# Patient Record
Sex: MALE | Race: WHITE | NOT HISPANIC OR LATINO | Employment: OTHER | URBAN - METROPOLITAN AREA
[De-identification: names, ages, dates, MRNs, and addresses within clinical notes are randomized per-mention and may not be internally consistent; named-entity substitution may affect disease eponyms.]

---

## 2023-03-15 LAB — PROSTATE SPECIFIC AG (NG/ML) IN SER/PLAS: 2.35 NG/ML (ref 0–4)

## 2023-05-10 ENCOUNTER — OFFICE VISIT (OUTPATIENT)
Dept: PRIMARY CARE | Facility: CLINIC | Age: 68
End: 2023-05-10
Payer: MEDICARE

## 2023-05-10 VITALS
BODY MASS INDEX: 21.09 KG/M2 | TEMPERATURE: 97.6 F | WEIGHT: 147 LBS | HEART RATE: 91 BPM | SYSTOLIC BLOOD PRESSURE: 167 MMHG | DIASTOLIC BLOOD PRESSURE: 92 MMHG | OXYGEN SATURATION: 96 %

## 2023-05-10 DIAGNOSIS — C61 PROSTATE CANCER (MULTI): ICD-10-CM

## 2023-05-10 DIAGNOSIS — Z12.11 ENCOUNTER FOR SCREENING FOR MALIGNANT NEOPLASM OF COLON: Primary | ICD-10-CM

## 2023-05-10 PROBLEM — N52.35 ERECTILE DYSFUNCTION FOLLOWING RADIATION THERAPY: Status: ACTIVE | Noted: 2023-05-10

## 2023-05-10 PROBLEM — E78.5 HYPERLIPIDEMIA: Status: ACTIVE | Noted: 2023-05-10

## 2023-05-10 PROBLEM — D12.6 TUBULAR ADENOMA OF COLON: Status: ACTIVE | Noted: 2023-05-10

## 2023-05-10 PROBLEM — J31.0 CHRONIC RHINITIS: Status: ACTIVE | Noted: 2023-05-10

## 2023-05-10 PROBLEM — J34.2 NASAL SEPTAL DEVIATION: Status: ACTIVE | Noted: 2023-05-10

## 2023-05-10 PROBLEM — I49.3 PREMATURE VENTRICULAR CONTRACTIONS: Status: ACTIVE | Noted: 2023-05-10

## 2023-05-10 PROBLEM — N40.0 BENIGN LOCALIZED HYPERPLASIA OF PROSTATE: Status: ACTIVE | Noted: 2023-05-10

## 2023-05-10 PROBLEM — N40.2 PROSTATE NODULE: Status: ACTIVE | Noted: 2023-05-10

## 2023-05-10 PROBLEM — I10 WHITE COAT SYNDROME WITH HYPERTENSION: Status: ACTIVE | Noted: 2023-05-10

## 2023-05-10 PROBLEM — J32.4 CHRONIC PANSINUSITIS: Status: ACTIVE | Noted: 2023-05-10

## 2023-05-10 PROBLEM — J33.9 NASAL POLYP: Status: ACTIVE | Noted: 2023-05-10

## 2023-05-10 PROBLEM — J34.89 LESION OR MASS OF PARANASAL SINUSES: Status: ACTIVE | Noted: 2023-05-10

## 2023-05-10 PROBLEM — I10 BENIGN ESSENTIAL HYPERTENSION: Status: ACTIVE | Noted: 2023-05-10

## 2023-05-10 PROBLEM — E55.9 VITAMIN D DEFICIENCY: Status: ACTIVE | Noted: 2023-05-10

## 2023-05-10 PROBLEM — J34.89 NASAL DRAINAGE: Status: ACTIVE | Noted: 2023-05-10

## 2023-05-10 PROCEDURE — 1159F MED LIST DOCD IN RCRD: CPT | Performed by: STUDENT IN AN ORGANIZED HEALTH CARE EDUCATION/TRAINING PROGRAM

## 2023-05-10 PROCEDURE — 3077F SYST BP >= 140 MM HG: CPT | Performed by: STUDENT IN AN ORGANIZED HEALTH CARE EDUCATION/TRAINING PROGRAM

## 2023-05-10 PROCEDURE — 3080F DIAST BP >= 90 MM HG: CPT | Performed by: STUDENT IN AN ORGANIZED HEALTH CARE EDUCATION/TRAINING PROGRAM

## 2023-05-10 PROCEDURE — 1160F RVW MEDS BY RX/DR IN RCRD: CPT | Performed by: STUDENT IN AN ORGANIZED HEALTH CARE EDUCATION/TRAINING PROGRAM

## 2023-05-10 PROCEDURE — G0439 PPPS, SUBSEQ VISIT: HCPCS | Performed by: STUDENT IN AN ORGANIZED HEALTH CARE EDUCATION/TRAINING PROGRAM

## 2023-05-10 PROCEDURE — 99214 OFFICE O/P EST MOD 30 MIN: CPT | Performed by: STUDENT IN AN ORGANIZED HEALTH CARE EDUCATION/TRAINING PROGRAM

## 2023-05-10 PROCEDURE — 1036F TOBACCO NON-USER: CPT | Performed by: STUDENT IN AN ORGANIZED HEALTH CARE EDUCATION/TRAINING PROGRAM

## 2023-05-10 PROCEDURE — 1170F FXNL STATUS ASSESSED: CPT | Performed by: STUDENT IN AN ORGANIZED HEALTH CARE EDUCATION/TRAINING PROGRAM

## 2023-05-10 RX ORDER — MULTIVITAMIN
1 TABLET ORAL DAILY
COMMUNITY

## 2023-05-10 RX ORDER — ACETAMINOPHEN 500 MG
TABLET ORAL
COMMUNITY
End: 2023-10-24 | Stop reason: ALTCHOICE

## 2023-05-10 RX ORDER — PRAMOXINE HYDROCHLORIDE AND HYDROCORTISONE ACETATE 100; 100 MG/10G; MG/10G
AEROSOL, FOAM TOPICAL 2 TIMES DAILY PRN
COMMUNITY
Start: 2023-04-23

## 2023-05-10 ASSESSMENT — ACTIVITIES OF DAILY LIVING (ADL)
MANAGING_FINANCES: INDEPENDENT
DOING_HOUSEWORK: INDEPENDENT
GROCERY_SHOPPING: INDEPENDENT
DRESSING: INDEPENDENT
TAKING_MEDICATION: INDEPENDENT
BATHING: INDEPENDENT

## 2023-05-10 ASSESSMENT — PATIENT HEALTH QUESTIONNAIRE - PHQ9
SUM OF ALL RESPONSES TO PHQ9 QUESTIONS 1 AND 2: 0
2. FEELING DOWN, DEPRESSED OR HOPELESS: NOT AT ALL
1. LITTLE INTEREST OR PLEASURE IN DOING THINGS: NOT AT ALL

## 2023-05-10 NOTE — PROGRESS NOTES
Subjective   Reason for Visit: Man Flannery is an 67 y.o. male here for a Medicare Wellness visit.     Past Medical, Surgical, and Family History reviewed and updated in chart.    Reviewed all medications by prescribing practitioner or clinical pharmacist (such as prescriptions, OTCs, herbal therapies and supplements) and documented in the medical record.    HPI  prostate cancer s/p therapy in remission, ?HTN, ?HLD presenting for MWV and f/u.     Life/social: Retired ; [employer] rights defense. . One son, age 36. Hobbies include travel, exercise (walking). No tobacco. Social low risk EtOH, no illicits. Regular light exercise.      Re: prostate cancer - dx'd with prostate cancer in 2019, now with oligometastatic recurrence. Plan is for ADT and another round of radiation therapy. He has lots of questions for me about timing.       Re: CV - had PVCs a few years back. Heart monitor and TTE were essentially normal. Sx resolved. BP at home is at goal, all readings 130 SBP or less, DBP 90 or less. Reports no sx high or low from HTN; denies blurry vision, HA, dizziness LoC CP SoB Palmer and leg swelling      Re: HM - MWV completed today. Shots UTD. CRS due this year, he is inquiring about getting this done prior to his XRT. PSA as per urology team.      PMHx, FHx, Social Hx, Surg Hx personally reviewed at this appointment. No pertinent findings and/or changes from prior (if applicable).     ROS: Denies wt gain/loss f/c HA LoC CP SOB NVDC. See HPI above, and scanned sheet (if applicable). All other systems are reviewed and are without complaint.       Patient Care Team:  Seferino Sandy MD as PCP - General     Review of Systems    Objective   Vitals:  BP (!) 167/92   Pulse 91   Temp 36.4 °C (97.6 °F)   Wt 66.7 kg (147 lb)   SpO2 96%   BMI 21.09 kg/m²       Physical Exam  Gen: well developed in NAD. AAO x3.  HEENT: NC/AT. Anicteric sclera, symmetric pupils. MMM no thrush.  Neck: Soft, supple. No LAD. No  goiter.   CV: RRR nl s1s2 no m/r/g  Pulm: CTAB no w/r/r, good air exchange  GI: soft NTND BS+ no hsm  Ext: WWP no edema  Neuro: II-XII grossly intact, nonfocal systemic findings  MSK: 5/5 strength b/l UE and LE  Gait: unremarkable    Assessment/Plan   # Prostate Ca  - agree with XRT and ADT  - follow up urology/rad-onc    # Colon polyp: screening due this year  - will touch base with gastroenterologist about moving up his scope prior to XRT     # Health Maintenance  - routine blood work; meds are current  - Colon Cancer Screening: ordered, see above  - PSA: through urology  - Immunizations: UTD    Problem List Items Addressed This Visit       Prostate cancer (CMS/HCC)     Other Visit Diagnoses       Encounter for screening for malignant neoplasm of colon    -  Primary    Relevant Orders    Colonoscopy

## 2023-05-10 NOTE — PATIENT INSTRUCTIONS
Please stop at the lab ([address]) to complete your blood and/or urine work that your urology team ordered for you; I do not need additional labs that are different from what they ordered.    I have reached out to Dr. Milner about timing of a colonoscopy    I agree with XRT and ADT for your prostate cancer

## 2023-05-11 LAB
PROSTATE SPECIFIC AG (NG/ML) IN SER/PLAS: 2.87 NG/ML (ref 0–4)
TESTOSTERONE (NG/DL) IN SER/PLAS: 770 NG/DL (ref 240–1000)

## 2023-05-30 LAB
ALANINE AMINOTRANSFERASE (SGPT) (U/L) IN SER/PLAS: 13 U/L (ref 10–52)
ALBUMIN (G/DL) IN SER/PLAS: 4.1 G/DL (ref 3.4–5)
ALKALINE PHOSPHATASE (U/L) IN SER/PLAS: 39 U/L (ref 33–136)
ANION GAP IN SER/PLAS: 10 MMOL/L (ref 10–20)
ASPARTATE AMINOTRANSFERASE (SGOT) (U/L) IN SER/PLAS: 15 U/L (ref 9–39)
BASOPHILS (10*3/UL) IN BLOOD BY AUTOMATED COUNT: 0.04 X10E9/L (ref 0–0.1)
BASOPHILS/100 LEUKOCYTES IN BLOOD BY AUTOMATED COUNT: 0.8 % (ref 0–2)
BILIRUBIN TOTAL (MG/DL) IN SER/PLAS: 0.6 MG/DL (ref 0–1.2)
CALCIUM (MG/DL) IN SER/PLAS: 9.6 MG/DL (ref 8.6–10.6)
CARBON DIOXIDE, TOTAL (MMOL/L) IN SER/PLAS: 29 MMOL/L (ref 21–32)
CHLORIDE (MMOL/L) IN SER/PLAS: 106 MMOL/L (ref 98–107)
CREATININE (MG/DL) IN SER/PLAS: 0.91 MG/DL (ref 0.5–1.3)
EOSINOPHILS (10*3/UL) IN BLOOD BY AUTOMATED COUNT: 0.17 X10E9/L (ref 0–0.7)
EOSINOPHILS/100 LEUKOCYTES IN BLOOD BY AUTOMATED COUNT: 3.3 % (ref 0–6)
ERYTHROCYTE DISTRIBUTION WIDTH (RATIO) BY AUTOMATED COUNT: 12.2 % (ref 11.5–14.5)
ERYTHROCYTE MEAN CORPUSCULAR HEMOGLOBIN CONCENTRATION (G/DL) BY AUTOMATED: 33.4 G/DL (ref 32–36)
ERYTHROCYTE MEAN CORPUSCULAR VOLUME (FL) BY AUTOMATED COUNT: 89 FL (ref 80–100)
ERYTHROCYTES (10*6/UL) IN BLOOD BY AUTOMATED COUNT: 5.19 X10E12/L (ref 4.5–5.9)
GFR MALE: >90 ML/MIN/1.73M2
GLUCOSE (MG/DL) IN SER/PLAS: 90 MG/DL (ref 74–99)
HEMATOCRIT (%) IN BLOOD BY AUTOMATED COUNT: 46.4 % (ref 41–52)
HEMOGLOBIN (G/DL) IN BLOOD: 15.5 G/DL (ref 13.5–17.5)
IMMATURE GRANULOCYTES/100 LEUKOCYTES IN BLOOD BY AUTOMATED COUNT: 0.2 % (ref 0–0.9)
LEUKOCYTES (10*3/UL) IN BLOOD BY AUTOMATED COUNT: 5.1 X10E9/L (ref 4.4–11.3)
LYMPHOCYTES (10*3/UL) IN BLOOD BY AUTOMATED COUNT: 1.76 X10E9/L (ref 1.2–4.8)
LYMPHOCYTES/100 LEUKOCYTES IN BLOOD BY AUTOMATED COUNT: 34.3 % (ref 13–44)
MONOCYTES (10*3/UL) IN BLOOD BY AUTOMATED COUNT: 0.56 X10E9/L (ref 0.1–1)
MONOCYTES/100 LEUKOCYTES IN BLOOD BY AUTOMATED COUNT: 10.9 % (ref 2–10)
NEUTROPHILS (10*3/UL) IN BLOOD BY AUTOMATED COUNT: 2.59 X10E9/L (ref 1.2–7.7)
NEUTROPHILS/100 LEUKOCYTES IN BLOOD BY AUTOMATED COUNT: 50.5 % (ref 40–80)
PLATELETS (10*3/UL) IN BLOOD AUTOMATED COUNT: 227 X10E9/L (ref 150–450)
POTASSIUM (MMOL/L) IN SER/PLAS: 4.4 MMOL/L (ref 3.5–5.3)
PROSTATE SPECIFIC AG (NG/ML) IN SER/PLAS: 2.82 NG/ML (ref 0–4)
PROTEIN TOTAL: 6.5 G/DL (ref 6.4–8.2)
SODIUM (MMOL/L) IN SER/PLAS: 141 MMOL/L (ref 136–145)
UREA NITROGEN (MG/DL) IN SER/PLAS: 20 MG/DL (ref 6–23)

## 2023-09-26 LAB
APPEARANCE, URINE: NORMAL
BILIRUBIN, URINE: NEGATIVE
BLOOD, URINE: NEGATIVE
COLOR, URINE: YELLOW
GLUCOSE, URINE: NEGATIVE MG/DL
KETONES, URINE: NEGATIVE MG/DL
LEUKOCYTE ESTERASE, URINE: NEGATIVE
NITRITE, URINE: NEGATIVE
PH, URINE: 5 (ref 5–8)
PROTEIN, URINE: NEGATIVE MG/DL
SPECIFIC GRAVITY, URINE: 1.02 (ref 1–1.03)
UROBILINOGEN, URINE: <2 MG/DL (ref 0–1.9)

## 2023-09-27 LAB — URINE CULTURE: NO GROWTH

## 2023-10-22 PROBLEM — Z79.818 ANDROGEN DEPRIVATION THERAPY: Status: ACTIVE | Noted: 2023-10-22

## 2023-10-22 PROBLEM — Z79.2 NEED FOR PROPHYLACTIC ANTIBIOTIC: Status: ACTIVE | Noted: 2023-10-22

## 2023-10-22 PROBLEM — C44.92 SCC (SQUAMOUS CELL CARCINOMA): Status: ACTIVE | Noted: 2023-10-22

## 2023-10-22 PROBLEM — R03.0 ELEVATED BLOOD PRESSURE READING: Status: ACTIVE | Noted: 2023-10-22

## 2023-10-22 RX ORDER — SODIUM PICOSULFATE, MAGNESIUM OXIDE, AND ANHYDROUS CITRIC ACID 12; 3.5; 1 G/175ML; G/175ML; MG/175ML
LIQUID ORAL
COMMUNITY
Start: 2023-05-11 | End: 2023-10-24 | Stop reason: ALTCHOICE

## 2023-10-22 ASSESSMENT — PATIENT HEALTH QUESTIONNAIRE - PHQ9
8. MOVING OR SPEAKING SO SLOWLY THAT OTHER PEOPLE COULD HAVE NOTICED. OR THE OPPOSITE, BEING SO FIGETY OR RESTLESS THAT YOU HAVE BEEN MOVING AROUND A LOT MORE THAN USUAL: 0
SUM OF ALL RESPONSES TO PHQ QUESTIONS 1-9: 0
5. POOR APPETITE OR OVEREATING: NOT AT ALL
2. FEELING DOWN, DEPRESSED OR HOPELESS: NOT AT ALL
1. LITTLE INTEREST OR PLEASURE IN DOING THINGS: NOT AT ALL
9. THOUGHTS THAT YOU WOULD BE BETTER OFF DEAD, OR OF HURTING YOURSELF: NOT AT ALL
7. TROUBLE CONCENTRATING ON THINGS, SUCH AS READING THE NEWSPAPER OR WATCHING TELEVISION: 0
8. MOVING OR SPEAKING SO SLOWLY THAT OTHER PEOPLE COULD HAVE NOTICED. OR THE OPPOSITE, BEING SO FIGETY OR RESTLESS THAT YOU HAVE BEEN MOVING AROUND A LOT MORE THAN USUAL: NOT AT ALL
6. FEELING BAD ABOUT YOURSELF - OR THAT YOU ARE A FAILURE OR HAVE LET YOURSELF OR YOUR FAMILY DOWN: 0
3. TROUBLE FALLING OR STAYING ASLEEP OR SLEEPING TOO MUCH: NOT AT ALL
8. MOVING OR SPEAKING SO SLOWLY THAT OTHER PEOPLE COULD HAVE NOTICED. OR THE OPPOSITE, BEING SO FIGETY OR RESTLESS THAT YOU HAVE BEEN MOVING AROUND A LOT MORE THAN USUAL: 0
2. FEELING DOWN, DEPRESSED, IRRITABLE, OR HOPELESS: NOT AT ALL
5. POOR APPETITE OR OVEREATING: 0
5. POOR APPETITE OR OVEREATING: 0
6. FEELING BAD ABOUT YOURSELF - OR THAT YOU ARE A FAILURE OR HAVE LET YOURSELF OR YOUR FAMILY DOWN: NOT AT ALL
6. FEELING BAD ABOUT YOURSELF - OR THAT YOU ARE A FAILURE OR HAVE LET YOURSELF OR YOUR FAMILY DOWN: 0
9. THOUGHTS THAT YOU WOULD BE BETTER OFF DEAD, OR OF HURTING YOURSELF: NOT AT ALL
4. FEELING TIRED OR HAVING LITTLE ENERGY: 0
4. FEELING TIRED OR HAVING LITTLE ENERGY: NOT AT ALL
5. POOR APPETITE OR OVEREATING: NOT AT ALL
8. MOVING OR SPEAKING SO SLOWLY THAT OTHER PEOPLE COULD HAVE NOTICED. OR THE OPPOSITE, BEING SO FIGETY OR RESTLESS THAT YOU HAVE BEEN MOVING AROUND A LOT MORE THAN USUAL: NOT AT ALL
5. POOR APPETITE OR OVEREATING: NOT AT ALL
3. TROUBLE FALLING OR STAYING ASLEEP OR SLEEPING TOO MUCH: NOT AT ALL
SUM OF ALL RESPONSES TO PHQ QUESTIONS 1-9: 0
2. FEELING DOWN, DEPRESSED, IRRITABLE, OR HOPELESS: 0
5. POOR APPETITE OR OVEREATING: NOT AT ALL
9. THOUGHTS THAT YOU WOULD BE BETTER OFF DEAD, OR OF HURTING YOURSELF: 0
7. TROUBLE CONCENTRATING ON THINGS, SUCH AS READING THE NEWSPAPER OR WATCHING TELEVISION: NOT AT ALL
3. TROUBLE FALLING OR STAYING ASLEEP OR SLEEPING TOO MUCH: NOT AT ALL
4. FEELING TIRED OR HAVING LITTLE ENERGY: 0
1. LITTLE INTEREST OR PLEASURE IN DOING THINGS: NOT AT ALL
7. TROUBLE CONCENTRATING ON THINGS, SUCH AS READING THE NEWSPAPER OR WATCHING TELEVISION: NOT AT ALL
6. FEELING BAD ABOUT YOURSELF - OR THAT YOU ARE A FAILURE OR HAVE LET YOURSELF OR YOUR FAMILY DOWN: NOT AT ALL
6. FEELING BAD ABOUT YOURSELF - OR THAT YOU ARE A FAILURE OR HAVE LET YOURSELF OR YOUR FAMILY DOWN: NOT AT ALL
9. THOUGHTS THAT YOU WOULD BE BETTER OFF DEAD, OR OF HURTING YOURSELF: NOT AT ALL
4. FEELING TIRED OR HAVING LITTLE ENERGY: NOT AT ALL
7. TROUBLE CONCENTRATING ON THINGS, SUCH AS READING THE NEWSPAPER OR WATCHING TELEVISION: NOT AT ALL
9. THOUGHTS THAT YOU WOULD BE BETTER OFF DEAD, OR OF HURTING YOURSELF: NOT AT ALL
SUM OF ALL RESPONSES TO PHQ QUESTIONS 1-9: 0
4. FEELING TIRED OR HAVING LITTLE ENERGY: NOT AT ALL
SUM OF ALL RESPONSES TO PHQ QUESTIONS 1-9: 0
3. TROUBLE FALLING OR STAYING ASLEEP OR SLEEPING TOO MUCH: 0
1. LITTLE INTEREST OR PLEASURE IN DOING THINGS: NOT AT ALL
1. LITTLE INTEREST OR PLEASURE IN DOING THINGS: NOT AT ALL
7. TROUBLE CONCENTRATING ON THINGS, SUCH AS READING THE NEWSPAPER OR WATCHING TELEVISION: NOT AT ALL
8. MOVING OR SPEAKING SO SLOWLY THAT OTHER PEOPLE COULD HAVE NOTICED. OR THE OPPOSITE, BEING SO FIGETY OR RESTLESS THAT YOU HAVE BEEN MOVING AROUND A LOT MORE THAN USUAL: NOT AT ALL
8. MOVING OR SPEAKING SO SLOWLY THAT OTHER PEOPLE COULD HAVE NOTICED. OR THE OPPOSITE, BEING SO FIGETY OR RESTLESS THAT YOU HAVE BEEN MOVING AROUND A LOT MORE THAN USUAL: NOT AT ALL
1. LITTLE INTEREST OR PLEASURE IN DOING THINGS: 0
7. TROUBLE CONCENTRATING ON THINGS, SUCH AS READING THE NEWSPAPER OR WATCHING TELEVISION: 0
3. TROUBLE FALLING OR STAYING ASLEEP OR SLEEPING TOO MUCH: NOT AT ALL
6. FEELING BAD ABOUT YOURSELF - OR THAT YOU ARE A FAILURE OR HAVE LET YOURSELF OR YOUR FAMILY DOWN: NOT AT ALL
2. FEELING DOWN, DEPRESSED, IRRITABLE, OR HOPELESS: NOT AT ALL
1. LITTLE INTEREST OR PLEASURE IN DOING THINGS: 0
2. FEELING DOWN, DEPRESSED OR HOPELESS: NOT AT ALL
3. TROUBLE FALLING OR STAYING ASLEEP OR SLEEPING TOO MUCH: 0
9. THOUGHTS THAT YOU WOULD BE BETTER OFF DEAD, OR OF HURTING YOURSELF: 0
2. FEELING DOWN, DEPRESSED, IRRITABLE, OR HOPELESS: 0
4. FEELING TIRED OR HAVING LITTLE ENERGY: NOT AT ALL

## 2023-10-23 ENCOUNTER — PHARMACY VISIT (OUTPATIENT)
Dept: PHARMACY | Facility: CLINIC | Age: 68
End: 2023-10-23
Payer: COMMERCIAL

## 2023-10-23 ENCOUNTER — SPECIALTY PHARMACY (OUTPATIENT)
Dept: PHARMACY | Facility: CLINIC | Age: 68
End: 2023-10-23

## 2023-10-23 DIAGNOSIS — C61 PROSTATE CANCER (MULTI): Primary | ICD-10-CM

## 2023-10-23 PROCEDURE — RXMED WILLOW AMBULATORY MEDICATION CHARGE

## 2023-10-23 RX ORDER — ABIRATERONE ACETATE 250 MG/1
1000 TABLET ORAL DAILY
Qty: 240 TABLET | Refills: 3 | Status: CANCELLED | OUTPATIENT
Start: 2023-10-23

## 2023-10-23 RX ORDER — PREDNISONE 5 MG/1
5 TABLET ORAL DAILY
Qty: 60 TABLET | Refills: 11 | Status: SHIPPED | OUTPATIENT
Start: 2023-10-23 | End: 2024-04-18 | Stop reason: ALTCHOICE

## 2023-10-23 RX ORDER — ABIRATERONE ACETATE 250 MG/1
1000 TABLET ORAL DAILY
Qty: 240 TABLET | Refills: 11 | Status: SHIPPED | OUTPATIENT
Start: 2023-10-23 | End: 2024-04-18 | Stop reason: ALTCHOICE

## 2023-10-24 ENCOUNTER — APPOINTMENT (OUTPATIENT)
Dept: LAB | Facility: CLINIC | Age: 68
End: 2023-10-24
Payer: MEDICARE

## 2023-10-24 ENCOUNTER — SPECIALTY PHARMACY (OUTPATIENT)
Dept: HEMATOLOGY/ONCOLOGY | Facility: CLINIC | Age: 68
End: 2023-10-24
Payer: MEDICARE

## 2023-10-24 ENCOUNTER — LAB (OUTPATIENT)
Dept: LAB | Facility: CLINIC | Age: 68
End: 2023-10-24
Payer: MEDICARE

## 2023-10-24 DIAGNOSIS — C61 PROSTATE CANCER (MULTI): Primary | ICD-10-CM

## 2023-10-24 DIAGNOSIS — C61 MALIGNANT NEOPLASM OF PROSTATE (MULTI): ICD-10-CM

## 2023-10-24 LAB
ALBUMIN SERPL BCP-MCNC: 4.1 G/DL (ref 3.4–5)
ALP SERPL-CCNC: 44 U/L (ref 33–136)
ALT SERPL W P-5'-P-CCNC: 32 U/L (ref 10–52)
ANION GAP SERPL CALC-SCNC: 12 MMOL/L (ref 10–20)
AST SERPL W P-5'-P-CCNC: 27 U/L (ref 9–39)
BASOPHILS # BLD AUTO: 0.04 X10*3/UL (ref 0–0.1)
BASOPHILS NFR BLD AUTO: 0.9 %
BILIRUB SERPL-MCNC: 0.8 MG/DL (ref 0–1.2)
BUN SERPL-MCNC: 18 MG/DL (ref 6–23)
CALCIUM SERPL-MCNC: 9.9 MG/DL (ref 8.6–10.6)
CHLORIDE SERPL-SCNC: 105 MMOL/L (ref 98–107)
CO2 SERPL-SCNC: 30 MMOL/L (ref 21–32)
CREAT SERPL-MCNC: 1.05 MG/DL (ref 0.5–1.3)
EOSINOPHIL # BLD AUTO: 0.29 X10*3/UL (ref 0–0.7)
EOSINOPHIL NFR BLD AUTO: 6.3 %
ERYTHROCYTE [DISTWIDTH] IN BLOOD BY AUTOMATED COUNT: 12.4 % (ref 11.5–14.5)
GFR SERPL CREATININE-BSD FRML MDRD: 77 ML/MIN/1.73M*2
GLUCOSE SERPL-MCNC: 87 MG/DL (ref 74–99)
HCT VFR BLD AUTO: 44.2 % (ref 41–52)
HGB BLD-MCNC: 14.4 G/DL (ref 13.5–17.5)
IMM GRANULOCYTES # BLD AUTO: 0 X10*3/UL (ref 0–0.7)
IMM GRANULOCYTES NFR BLD AUTO: 0 % (ref 0–0.9)
LYMPHOCYTES # BLD AUTO: 1.02 X10*3/UL (ref 1.2–4.8)
LYMPHOCYTES NFR BLD AUTO: 22.2 %
MCH RBC QN AUTO: 29.9 PG (ref 26–34)
MCHC RBC AUTO-ENTMCNC: 32.6 G/DL (ref 32–36)
MCV RBC AUTO: 92 FL (ref 80–100)
MONOCYTES # BLD AUTO: 0.59 X10*3/UL (ref 0.1–1)
MONOCYTES NFR BLD AUTO: 12.9 %
NEUTROPHILS # BLD AUTO: 2.65 X10*3/UL (ref 1.2–7.7)
NEUTROPHILS NFR BLD AUTO: 57.7 %
NRBC BLD-RTO: ABNORMAL /100{WBCS}
PLATELET # BLD AUTO: 245 X10*3/UL (ref 150–450)
PMV BLD AUTO: 9 FL (ref 7.5–11.5)
POTASSIUM SERPL-SCNC: 4 MMOL/L (ref 3.5–5.3)
PROT SERPL-MCNC: 6.5 G/DL (ref 6.4–8.2)
PSA SERPL-MCNC: <0.1 NG/ML
RBC # BLD AUTO: 4.81 X10*6/UL (ref 4.5–5.9)
SODIUM SERPL-SCNC: 143 MMOL/L (ref 136–145)
TESTOST SERPL-MCNC: <30 NG/DL (ref 240–1000)
WBC # BLD AUTO: 4.6 X10*3/UL (ref 4.4–11.3)

## 2023-10-24 PROCEDURE — 85025 COMPLETE CBC W/AUTO DIFF WBC: CPT

## 2023-10-24 PROCEDURE — 36415 COLL VENOUS BLD VENIPUNCTURE: CPT

## 2023-10-24 PROCEDURE — 84153 ASSAY OF PSA TOTAL: CPT | Performed by: NURSE PRACTITIONER

## 2023-10-24 PROCEDURE — 84403 ASSAY OF TOTAL TESTOSTERONE: CPT | Performed by: NURSE PRACTITIONER

## 2023-10-24 PROCEDURE — 80053 COMPREHEN METABOLIC PANEL: CPT | Performed by: NURSE PRACTITIONER

## 2023-10-24 RX ORDER — ALBUTEROL SULFATE 0.83 MG/ML
3 SOLUTION RESPIRATORY (INHALATION) AS NEEDED
Status: CANCELLED | OUTPATIENT
Start: 2023-10-25

## 2023-10-24 RX ORDER — DIPHENHYDRAMINE HYDROCHLORIDE 50 MG/ML
50 INJECTION INTRAMUSCULAR; INTRAVENOUS AS NEEDED
Status: CANCELLED | OUTPATIENT
Start: 2023-10-25

## 2023-10-24 RX ORDER — FAMOTIDINE 10 MG/ML
20 INJECTION INTRAVENOUS ONCE AS NEEDED
Status: CANCELLED | OUTPATIENT
Start: 2023-10-25

## 2023-10-24 RX ORDER — EPINEPHRINE 0.3 MG/.3ML
0.3 INJECTION SUBCUTANEOUS EVERY 5 MIN PRN
Status: CANCELLED | OUTPATIENT
Start: 2023-10-25

## 2023-10-24 NOTE — PROGRESS NOTES
"Patient ID: Man Flannery is a 67 y.o. male.  Attending Physician: Dr. Dom Jules  Cancer Diagnosis:  Cancer Staging   No matching staging information was found for the patient.   Current Therapy: ADT (Trelstart IM q12mos)  Abiraterone Acetate 1000 mg PO daily  Prednisone 5 mg PO daily    Subjective  1    2/5/19: Dx intermediate prostate ca ([place] 3+4=7/iPSA 9.98/ GG2)    8/28/20: completed EBRT + 6 months ADT    9/25/20: PSA kimmy 0.42    4/5/21: PSA 1.24    3/10/22: PSA 1.44    9/14/22: PSA 1.65    3/15/23: PSA 2.35    4/25/23: PSMA PET with avid pre-sacral LNs    5/18/23: Start ADT (Trelstar)    6/1/23: Start abiraterone + prednisone    8/4/23: Elevated LFT's- grade 1- continue abiraterone/prednisone, ADT    8/16/23: Started radiation    Cancer History:  Oncology History   Prostate cancer (CMS/HCC)   5/10/2023 Initial Diagnosis    Prostate cancer (CMS/HCC)     6/1/2023 -  Chemotherapy    Abiraterone / PredniSONE, 84 Day Cycles         Interval History:  Man is doing well. He reports lately he has had noo real hot flashes. During radiation, he had 1-2 weeks of diarrhea after radiation which as resolved. Weight is stable. Urinary symptoms: some nocturia and some urgency during the day, but at baseline. No other new or concerning symptoms. The remainder of his ROS is otherwise negative.    HPI    Objective    BSA: 1.81 meters squared  /88   Pulse 85   Temp 36.5 °C (97.7 °F)   Resp 17   Ht (S) 1.753 m (5' 9.02\")   Wt 67.2 kg (148 lb 2.4 oz)   SpO2 97%   BMI 21.87 kg/m²     Physical Exam  PHYSICAL EXAM:   General: alert, well-dressed in NAD. Speech is fluent and coherent, words clear. Good insight. Oriented x4  Skin: warm, dry, and pink without cyanosis or nail clubbing. No rash, petechiae, or ecchymoses  HEENT: Normocephalic atraumatic. Sclera white, conjunctiva pink. EOMs intact. Hearing intact to spoken voice. No visible lesions  Respiratory: Chest expansion symmetric. No audible wheeze. Unlabored " breathing.  CV: Good color No edema bilaterally.  Psych: engaged, polite, appropriate conversation and eye contact.      Current Medications:    Current Outpatient Medications:     abiraterone (Zytiga) 250 mg tablet, Take 4 tablets (1,000 mg total) by mouth once daily.  Swallow whole. Do not eat 2 hrs before or 1 hr after., Disp: 240 tablet, Rfl: 11    docusate sodium (Colace) 50 mg capsule, Take 1 capsule (50 mg) by mouth 2 times a day as needed for constipation., Disp: , Rfl:     Epifoam 1-1 % foam, APPLY TOPICALLY TO AFFECTED AREA TWICE DAILY, Disp: , Rfl:     multivitamin (Daily Multi-Vitamin) tablet, Take 1 tablet by mouth once daily., Disp: , Rfl:     predniSONE (Deltasone) 5 mg tablet, Take 1 tablet (5 mg) by mouth once daily. Take with food., Disp: 60 tablet, Rfl: 11     Most Recent Labs:  Results for orders placed or performed in visit on 10/24/23   Comprehensive Metabolic Panel   Result Value Ref Range    Glucose 87 74 - 99 mg/dL    Sodium 143 136 - 145 mmol/L    Potassium 4.0 3.5 - 5.3 mmol/L    Chloride 105 98 - 107 mmol/L    Bicarbonate 30 21 - 32 mmol/L    Anion Gap 12 10 - 20 mmol/L    Urea Nitrogen 18 6 - 23 mg/dL    Creatinine 1.05 0.50 - 1.30 mg/dL    eGFR 77 >60 mL/min/1.73m*2    Calcium 9.9 8.6 - 10.6 mg/dL    Albumin 4.1 3.4 - 5.0 g/dL    Alkaline Phosphatase 44 33 - 136 U/L    Total Protein 6.5 6.4 - 8.2 g/dL    AST 27 9 - 39 U/L    Bilirubin, Total 0.8 0.0 - 1.2 mg/dL    ALT 32 10 - 52 U/L   CBC and Auto Differential   Result Value Ref Range    WBC 4.6 4.4 - 11.3 x10*3/uL    nRBC      RBC 4.81 4.50 - 5.90 x10*6/uL    Hemoglobin 14.4 13.5 - 17.5 g/dL    Hematocrit 44.2 41.0 - 52.0 %    MCV 92 80 - 100 fL    MCH 29.9 26.0 - 34.0 pg    MCHC 32.6 32.0 - 36.0 g/dL    RDW 12.4 11.5 - 14.5 %    Platelets 245 150 - 450 x10*3/uL    MPV 9.0 7.5 - 11.5 fL    Neutrophils % 57.7 40.0 - 80.0 %    Immature Granulocytes %, Automated 0.0 0.0 - 0.9 %    Lymphocytes % 22.2 13.0 - 44.0 %    Monocytes % 12.9 2.0 -  10.0 %    Eosinophils % 6.3 0.0 - 6.0 %    Basophils % 0.9 0.0 - 2.0 %    Neutrophils Absolute 2.65 1.20 - 7.70 x10*3/uL    Immature Granulocytes Absolute, Automated 0.00 0.00 - 0.70 x10*3/uL    Lymphocytes Absolute 1.02 (L) 1.20 - 4.80 x10*3/uL    Monocytes Absolute 0.59 0.10 - 1.00 x10*3/uL    Eosinophils Absolute 0.29 0.00 - 0.70 x10*3/uL    Basophils Absolute 0.04 0.00 - 0.10 x10*3/uL   Prostate Specific Antigen   Result Value Ref Range    Prostate Specific AG <0.10 <=4.00 ng/mL   Testosterone   Result Value Ref Range    Testosterone <30 (L) 240 - 1,000 ng/dL      Lab Results   Component Value Date    PSA <0.10 10/24/2023    PSA <0.10 08/03/2023    PSA 0.61 06/27/2023        Performance Status:  Asymptomatic  ECOG Score: 0- Fully active, able to carry on all pre-disease performance w/o restriction.  Karnofsky Score: 100 - Fully active, able to carry on all pre-disease performed without restriction      Assessment/Plan   Man Flannery is a 67 y.o. male with a history of intermediate risk (PSA 9.98 / [place] 3+4=7 / GG2) localized prostate cancer s/p EBRT + ADT (completed 8/28/20) who presents with rising PSA and avid pre-sacral LNs on PSMA PET consistent with recurrent prostate cancer.     He started ADT and AA/P with good PSA response. However, LFTs were noted to be elevated. They have since decreased to grade 1 and remained steady. Thus, we will continue as is, and monitor liver enzymes going forward.    # Recurrent prostate cancer  - Follows with radiation oncology (Dr. Ordoñez)  - Continue ADT every 3 months- due today, due next January  - Continue abiraterone + prednisone  - Plan for 1 year total course of treatment    #Elevated LFTs, resolved  - Continue to monitor    #Bone Health  - Ca/Vit D, continue with multivitamins/milk  - encourage regular physical activity    RTC at time of next injection with labs in January    Total time spent on this encounter was 40 minutes, which included preparation, direct time  with patient, documentation, and care coordination on the day of visit.    Doris Michelle, MSN, APRN, AGNP-C, AOCNP  Associate Nurse Practitioner  [facility]

## 2023-10-24 NOTE — PROGRESS NOTES
[facility] SPECIALTY PHARMACY PATIENT REASSESSMENT NOTE    [facility] SUPPLIED MEDICATION:  Abiraterone    The patient's care will be continued with the referring prescriber.     TOLERANCE:   Have you experienced any side effects from this medication? None    Are there any changes to current therapy regimen? None    EFFICACY:    Have you developed any new symptoms of your condition? None    How do you feel your medication is affecting your disease state? Stable    GOALS:  Your goals of therapy are: Prevent progression of cancer    COMPLIANCE / ADHERENCE:  Have you had any unplanned missed doses?No  If yes, how often do you miss doses and is there a particular contributing reason? N/A    What barriers to adherence does the patient have? (Answer Y/N for all):  Patient has a difficult time remembering to take medications? N  Difficult administration technique? N  Medication cost? N  Patient does not think medication is beneficial? N  Other? N/A  What actions were taken to address barriers? N/A    Does the patient have any barriers to self-administration (including physical and mental)? nO  List barriers: N/A  Describe actions taken to mitigate barriers: N/A    GENERAL ASSESSMENT:  Are there any changes to your home medications, OTCs or supplements? Yes - Medrec completed.    Do you have any new allergies? No     Have you been diagnosed with any new medical conditions? No    PATIENT MANAGEMENT:    Do you have any questions regarding your medications, or care? No    Do you have any concerns with access to your medications? No    How would you classify your Quality of Life on a scale of 1-10? 7    How would you describe your satisfaction with  Specialty Pharmacy services on a scale of 1-10? 10    Do you have any additional suggestions to improve  Specialty Pharmacy services: None    IMPRESSION/PLAN:  Is patient high risk (potential patients:  pregnancy, geriatric, pediatric)? Geriatric  Is laboratory follow-up  needed? No  Is a clinical intervention needed? No  Next reassessment date? 01/22/24  Additional comments: N/A    Lionel Valentine, PharmD, AAHIVP  Clinical Pharm Specialist -  Oncology  [facility]  Phone #: [phone]  Fax #: [phone]  Email: [email or web address]

## 2023-10-25 ENCOUNTER — OFFICE VISIT (OUTPATIENT)
Dept: HEMATOLOGY/ONCOLOGY | Facility: HOSPITAL | Age: 68
End: 2023-10-25
Payer: MEDICARE

## 2023-10-25 ENCOUNTER — INFUSION (OUTPATIENT)
Dept: HEMATOLOGY/ONCOLOGY | Facility: HOSPITAL | Age: 68
End: 2023-10-25
Payer: MEDICARE

## 2023-10-25 VITALS
HEART RATE: 85 BPM | HEIGHT: 69 IN | RESPIRATION RATE: 17 BRPM | OXYGEN SATURATION: 97 % | WEIGHT: 148.15 LBS | SYSTOLIC BLOOD PRESSURE: 154 MMHG | BODY MASS INDEX: 21.94 KG/M2 | DIASTOLIC BLOOD PRESSURE: 88 MMHG | TEMPERATURE: 97.7 F

## 2023-10-25 DIAGNOSIS — C61 PROSTATE CANCER (MULTI): Primary | ICD-10-CM

## 2023-10-25 DIAGNOSIS — C61 MALIGNANT NEOPLASM OF PROSTATE (MULTI): ICD-10-CM

## 2023-10-25 DIAGNOSIS — C61 PROSTATE CANCER (MULTI): ICD-10-CM

## 2023-10-25 PROCEDURE — 1126F AMNT PAIN NOTED NONE PRSNT: CPT | Performed by: NURSE PRACTITIONER

## 2023-10-25 PROCEDURE — 96402 CHEMO HORMON ANTINEOPL SQ/IM: CPT

## 2023-10-25 PROCEDURE — 99215 OFFICE O/P EST HI 40 MIN: CPT | Performed by: NURSE PRACTITIONER

## 2023-10-25 PROCEDURE — 3077F SYST BP >= 140 MM HG: CPT | Performed by: NURSE PRACTITIONER

## 2023-10-25 PROCEDURE — 1160F RVW MEDS BY RX/DR IN RCRD: CPT | Performed by: NURSE PRACTITIONER

## 2023-10-25 PROCEDURE — 3079F DIAST BP 80-89 MM HG: CPT | Performed by: NURSE PRACTITIONER

## 2023-10-25 PROCEDURE — 1036F TOBACCO NON-USER: CPT | Performed by: NURSE PRACTITIONER

## 2023-10-25 PROCEDURE — 1159F MED LIST DOCD IN RCRD: CPT | Performed by: NURSE PRACTITIONER

## 2023-10-25 PROCEDURE — 2500000004 HC RX 250 GENERAL PHARMACY W/ HCPCS (ALT 636 FOR OP/ED): Mod: JZ | Performed by: STUDENT IN AN ORGANIZED HEALTH CARE EDUCATION/TRAINING PROGRAM

## 2023-10-25 RX ORDER — ALBUTEROL SULFATE 0.83 MG/ML
3 SOLUTION RESPIRATORY (INHALATION) AS NEEDED
Status: CANCELLED | OUTPATIENT
Start: 2024-01-14

## 2023-10-25 RX ORDER — DIPHENHYDRAMINE HYDROCHLORIDE 50 MG/ML
50 INJECTION INTRAMUSCULAR; INTRAVENOUS AS NEEDED
Status: DISCONTINUED | OUTPATIENT
Start: 2023-10-25 | End: 2023-10-25 | Stop reason: HOSPADM

## 2023-10-25 RX ORDER — FAMOTIDINE 10 MG/ML
20 INJECTION INTRAVENOUS ONCE AS NEEDED
Status: DISCONTINUED | OUTPATIENT
Start: 2023-10-25 | End: 2023-10-25 | Stop reason: HOSPADM

## 2023-10-25 RX ORDER — ALBUTEROL SULFATE 0.83 MG/ML
3 SOLUTION RESPIRATORY (INHALATION) AS NEEDED
Status: DISCONTINUED | OUTPATIENT
Start: 2023-10-25 | End: 2023-10-25 | Stop reason: HOSPADM

## 2023-10-25 RX ORDER — EPINEPHRINE 0.3 MG/.3ML
0.3 INJECTION SUBCUTANEOUS EVERY 5 MIN PRN
Status: CANCELLED | OUTPATIENT
Start: 2024-01-14

## 2023-10-25 RX ORDER — EPINEPHRINE 0.3 MG/.3ML
0.3 INJECTION SUBCUTANEOUS EVERY 5 MIN PRN
Status: DISCONTINUED | OUTPATIENT
Start: 2023-10-25 | End: 2023-10-25 | Stop reason: HOSPADM

## 2023-10-25 RX ORDER — FAMOTIDINE 10 MG/ML
20 INJECTION INTRAVENOUS ONCE AS NEEDED
Status: CANCELLED | OUTPATIENT
Start: 2024-01-14

## 2023-10-25 RX ORDER — DIPHENHYDRAMINE HYDROCHLORIDE 50 MG/ML
50 INJECTION INTRAMUSCULAR; INTRAVENOUS AS NEEDED
Status: CANCELLED | OUTPATIENT
Start: 2024-01-14

## 2023-10-25 RX ADMIN — TRIPTORELIN PAMOATE 11.25 MG: KIT at 10:09

## 2023-10-25 ASSESSMENT — PAIN SCALES - GENERAL: PAINLEVEL: 0-NO PAIN

## 2023-10-25 NOTE — PROGRESS NOTES
0951 Patient arrived to infusion for Trelstar injection. Patient arrived from his clinic appointment- verbalized no issues.    1010 Patient tolerated injection without incident. Patient discharged ambulatory with support person.

## 2023-12-01 ENCOUNTER — ANCILLARY PROCEDURE (OUTPATIENT)
Dept: RADIOLOGY | Facility: CLINIC | Age: 68
End: 2023-12-01
Payer: MEDICARE

## 2023-12-01 DIAGNOSIS — C61 MALIGNANT NEOPLASM OF PROSTATE (MULTI): Primary | ICD-10-CM

## 2023-12-01 PROCEDURE — 2550000001 HC RX 255 CONTRASTS: Performed by: STUDENT IN AN ORGANIZED HEALTH CARE EDUCATION/TRAINING PROGRAM

## 2023-12-01 PROCEDURE — 74177 CT ABD & PELVIS W/CONTRAST: CPT | Performed by: RADIOLOGY

## 2023-12-01 PROCEDURE — 74177 CT ABD & PELVIS W/CONTRAST: CPT

## 2023-12-01 RX ADMIN — IOHEXOL 75 ML: 350 INJECTION, SOLUTION INTRAVENOUS at 10:54

## 2023-12-06 ENCOUNTER — TELEPHONE (OUTPATIENT)
Dept: RADIATION ONCOLOGY | Facility: HOSPITAL | Age: 68
End: 2023-12-06
Payer: MEDICARE

## 2023-12-06 NOTE — TELEPHONE ENCOUNTER
Called pt. to remind of appointment on 12/7/2023 at 10:00 am with GURPREET Daugherty.  Pt answered and confirmed appointment.

## 2023-12-06 NOTE — PROGRESS NOTES
Cancer synopsis:  Rad/onc: Dr. Ordoñez/ Dat CNP  Med/onc: Dr. Jules/ Viviana CNP    67 yo M with a history of favorable intermediate prostate cancer (cT1c, iPSA 9.98, GG2 on 2/12 cores+,  intraductal carcinoma present) who completed 70Gy/28fx to the prostate+SV (completed 8/28/20) + short-course ADT, with a PSA kimmy of 0.42 on 9/25/2020. The patient had evidence of rising since 4/5/2021 (with PSA 1.24), and had evidence of biochemical  recurrence in 3/15/2023 with a PSA of 2.35. PSMA PET was completed on 4/25/2023 which showed evidence of pre-sacral LN with PSMA avidity, without any evidence of local recurrence in the previously treated areas.     5/18/23: Start ADT (Trelstar)     6/1/23: Start abiraterone + prednisone    08/11/2023: Rt to Pelvic LN      History of presenting illness:    Patient ID: [id number]     Man Flannery is a 68 y.o. male who presents for his oligometastasic hormone sensitive prostate cancer now s/p RT and on current lng-term hormonal therapy.    RT Site: Prostate and SV 2020 and Pelivs LN 2023  RT Date: 07/22/2020 and 08/11/2023  Hormone therapy: Yes, currently on Adt and AA/p  Hot Flushes: Denies  Fatigue: Denies  Bone pain: Denies  SEJAL: 1  ED: Admits to loss of sexual function since   ED medications: No  IPSS: 6  Urinary symptoms: Admits to nocturia once a night. Does report some mild urine dribble however manageable. Does report seems to be related to caffeine intake. Denies hematuria or dysuria.  Urinary Medications: No  Rectal bleeding: Denies  Other systems: Denies SOB, CP or fever.      Review of systems:  Review of Systems   Constitutional:  Negative for fatigue, fever and unexpected weight change.   Respiratory:  Negative for cough, chest tightness and shortness of breath.    Cardiovascular:  Negative for chest pain, palpitations and leg swelling.   Gastrointestinal:  Negative for abdominal pain, anal bleeding, blood in stool, constipation, diarrhea and rectal pain.   Endocrine:  "Negative for cold intolerance, heat intolerance and polyuria.   Genitourinary:  Negative for decreased urine volume, difficulty urinating, dysuria, frequency, hematuria and urgency.   Musculoskeletal:  Negative for arthralgias, back pain, gait problem and joint swelling.   Skin: Negative.    Allergic/Immunologic: Negative.    Neurological:  Negative for dizziness, syncope and weakness.   Psychiatric/Behavioral: Negative.         Past Medical history  Past Medical History:   Diagnosis Date    Dysphagia, unspecified 10/03/2014    Pill dysphagia        Surgical/family history  Family History   Problem Relation Name Age of Onset    Hypertension Mother        Past Surgical History:   Procedure Laterality Date    OTHER SURGICAL HISTORY  03/15/2014    Injection For Chemonucleolysis Of Disk, With Discography    TONSILLECTOMY  09/24/2013    Tonsillectomy        Social History  Tobacco Use: Low Risk  (10/25/2023)    Patient History     Smoking Tobacco Use: Never     Smokeless Tobacco Use: Never     Passive Exposure: Not on file         Current med list:  Current Outpatient Medications   Medication Instructions    abiraterone (ZYTIGA) 1,000 mg, oral, Daily, Swallow whole. Do not eat 2 hrs before or 1 hr after.    docusate sodium (Colace) 50 mg capsule 1 capsule, oral, 2 times daily PRN    Epifoam 1-1 % foam APPLY TOPICALLY TO AFFECTED AREA TWICE DAILY    multivitamin (Daily Multi-Vitamin) tablet 1 tablet, oral, Daily    predniSONE (DELTASONE) 5 mg, oral, Daily, Take with food.        Last recorded vital:  /80 (BP Location: Left arm, Patient Position: Sitting, BP Cuff Size: Adult)   Pulse 92   Temp 35.9 °C (96.6 °F) (Temporal)   Resp 19   Ht 1.753 m (5' 9\")   Wt 67 kg (147 lb 11.3 oz)   SpO2 96%   BMI 21.81 kg/m²     Physical exam  Physical Exam  Constitutional:       Appearance: Normal appearance.   Cardiovascular:      Rate and Rhythm: Normal rate.   Pulmonary:      Effort: Pulmonary effort is normal.      Breath " sounds: Normal breath sounds.   Musculoskeletal:         General: Normal range of motion.      Cervical back: Normal range of motion.   Neurological:      Mental Status: He is alert and oriented to person, place, and time.   Psychiatric:         Mood and Affect: Mood normal.         Behavior: Behavior normal.         Thought Content: Thought content normal.         Judgment: Judgment normal.         Pertinent labs:  Prostate Specific AG   Date/Time Value Ref Range Status   10/24/2023 08:39 AM <0.10 <=4.00 ng/mL Final         Dx:  Problem List Items Addressed This Visit       Prostate cancer (CMS/HCC) - Primary    PSA of <0.10 was reviewed and is undetectable. Review of latent SE including rectal bleeding, hematuria, urinary strictures, ED where reviewed as well as how to contact office if s/s present. Denies latent SE. NCCN guidelines where reviewed and routine FUV of every 3m for first year and every 6m for four years for a total of five years was discussed. Patient verbalized understanding.     Reviewed most recent Ct imaging. Treated Ln stable and now resolved to normal at this time. No longer adenopathic. Ct does not potential adrenal gland cyst vs pheochromocytoma as well as potential associated liver lesion. Will reach out to radiology for further interpretation and if indicated will have MRI. Will notify patient of radiology discussion results.    Recommend vitamin D supplementation, start (or increase by) 400-1000 units daily. Reviewed importance of intake while on Testerone lowering therapy as well as after until Testerone re-establishes, at which point may stop if DEXA indicative of normal bone density. Also reviewed that individuals at a higher risk such as those over the age of 75 or those with a hx of bone fx, osteoporosis or osteopenia to continue supplementation indefinitely with routine DEXA imaging as per national guidelines to assess bone health continually.         PLAN:  FUV 6m  Labs per med/onc  for systemic therapy  Imaging none  Will notify with radiology discussion  FUV other providers: Med/onc for systemic therapy, PCP for routine evals        Please contact office with any concerns:  New Ramos CNP  [phone]

## 2023-12-07 ENCOUNTER — HOSPITAL ENCOUNTER (OUTPATIENT)
Dept: RADIATION ONCOLOGY | Facility: HOSPITAL | Age: 68
Setting detail: RADIATION/ONCOLOGY SERIES
Discharge: HOME | End: 2023-12-07
Payer: MEDICARE

## 2023-12-07 VITALS
WEIGHT: 147.71 LBS | HEART RATE: 92 BPM | TEMPERATURE: 96.6 F | SYSTOLIC BLOOD PRESSURE: 149 MMHG | RESPIRATION RATE: 19 BRPM | BODY MASS INDEX: 21.88 KG/M2 | OXYGEN SATURATION: 96 % | DIASTOLIC BLOOD PRESSURE: 80 MMHG | HEIGHT: 69 IN

## 2023-12-07 DIAGNOSIS — C61 PROSTATE CANCER (MULTI): Primary | ICD-10-CM

## 2023-12-07 PROCEDURE — 99214 OFFICE O/P EST MOD 30 MIN: CPT

## 2023-12-07 PROCEDURE — 99214 OFFICE O/P EST MOD 30 MIN: CPT | Mod: PO

## 2023-12-07 ASSESSMENT — ENCOUNTER SYMPTOMS
CHEST TIGHTNESS: 0
FATIGUE: 0
HEMATURIA: 0
BLOOD IN STOOL: 0
RECTAL PAIN: 0
CONSTIPATION: 0
ARTHRALGIAS: 0
ABDOMINAL PAIN: 0
UNEXPECTED WEIGHT CHANGE: 0
OCCASIONAL FEELINGS OF UNSTEADINESS: 0
PALPITATIONS: 0
BACK PAIN: 0
JOINT SWELLING: 0
FEVER: 0
ANAL BLEEDING: 0
DIFFICULTY URINATING: 0
DYSURIA: 0
SHORTNESS OF BREATH: 0
FREQUENCY: 0
DIARRHEA: 0
PSYCHIATRIC NEGATIVE: 1
WEAKNESS: 0
DEPRESSION: 0
COUGH: 0
LOSS OF SENSATION IN FEET: 0
ALLERGIC/IMMUNOLOGIC NEGATIVE: 1
DIZZINESS: 0

## 2023-12-07 ASSESSMENT — COLUMBIA-SUICIDE SEVERITY RATING SCALE - C-SSRS
6. HAVE YOU EVER DONE ANYTHING, STARTED TO DO ANYTHING, OR PREPARED TO DO ANYTHING TO END YOUR LIFE?: NO
2. HAVE YOU ACTUALLY HAD ANY THOUGHTS OF KILLING YOURSELF?: NO
1. IN THE PAST MONTH, HAVE YOU WISHED YOU WERE DEAD OR WISHED YOU COULD GO TO SLEEP AND NOT WAKE UP?: NO

## 2023-12-07 ASSESSMENT — PAIN SCALES - GENERAL: PAINLEVEL: 0-NO PAIN

## 2023-12-15 ENCOUNTER — SPECIALTY PHARMACY (OUTPATIENT)
Dept: PHARMACY | Facility: CLINIC | Age: 68
End: 2023-12-15

## 2023-12-15 PROCEDURE — RXMED WILLOW AMBULATORY MEDICATION CHARGE

## 2023-12-16 ENCOUNTER — PHARMACY VISIT (OUTPATIENT)
Dept: PHARMACY | Facility: CLINIC | Age: 68
End: 2023-12-16
Payer: COMMERCIAL

## 2024-01-16 ENCOUNTER — LAB (OUTPATIENT)
Dept: LAB | Facility: CLINIC | Age: 69
End: 2024-01-16
Payer: MEDICARE

## 2024-01-16 DIAGNOSIS — C61 PROSTATE CANCER (MULTI): ICD-10-CM

## 2024-01-16 LAB
ALBUMIN SERPL BCP-MCNC: 4 G/DL (ref 3.4–5)
ALP SERPL-CCNC: 45 U/L (ref 33–136)
ALT SERPL W P-5'-P-CCNC: 24 U/L (ref 10–52)
ANION GAP SERPL CALC-SCNC: 11 MMOL/L (ref 10–20)
AST SERPL W P-5'-P-CCNC: 20 U/L (ref 9–39)
BASOPHILS # BLD AUTO: 0.02 X10*3/UL (ref 0–0.1)
BASOPHILS NFR BLD AUTO: 0.4 %
BILIRUB SERPL-MCNC: 0.7 MG/DL (ref 0–1.2)
BUN SERPL-MCNC: 18 MG/DL (ref 6–23)
CALCIUM SERPL-MCNC: 10.1 MG/DL (ref 8.6–10.6)
CHLORIDE SERPL-SCNC: 105 MMOL/L (ref 98–107)
CO2 SERPL-SCNC: 31 MMOL/L (ref 21–32)
CREAT SERPL-MCNC: 0.94 MG/DL (ref 0.5–1.3)
EGFRCR SERPLBLD CKD-EPI 2021: 88 ML/MIN/1.73M*2
EOSINOPHIL # BLD AUTO: 0.18 X10*3/UL (ref 0–0.7)
EOSINOPHIL NFR BLD AUTO: 4 %
ERYTHROCYTE [DISTWIDTH] IN BLOOD BY AUTOMATED COUNT: 12.1 % (ref 11.5–14.5)
GLUCOSE SERPL-MCNC: 95 MG/DL (ref 74–99)
HCT VFR BLD AUTO: 44.7 % (ref 41–52)
HGB BLD-MCNC: 14.9 G/DL (ref 13.5–17.5)
IMM GRANULOCYTES # BLD AUTO: 0.01 X10*3/UL (ref 0–0.7)
IMM GRANULOCYTES NFR BLD AUTO: 0.2 % (ref 0–0.9)
LYMPHOCYTES # BLD AUTO: 1.09 X10*3/UL (ref 1.2–4.8)
LYMPHOCYTES NFR BLD AUTO: 24.3 %
MCH RBC QN AUTO: 29.7 PG (ref 26–34)
MCHC RBC AUTO-ENTMCNC: 33.3 G/DL (ref 32–36)
MCV RBC AUTO: 89 FL (ref 80–100)
MONOCYTES # BLD AUTO: 0.56 X10*3/UL (ref 0.1–1)
MONOCYTES NFR BLD AUTO: 12.5 %
NEUTROPHILS # BLD AUTO: 2.62 X10*3/UL (ref 1.2–7.7)
NEUTROPHILS NFR BLD AUTO: 58.6 %
NRBC BLD-RTO: ABNORMAL /100{WBCS}
PLATELET # BLD AUTO: 260 X10*3/UL (ref 150–450)
POTASSIUM SERPL-SCNC: 4.3 MMOL/L (ref 3.5–5.3)
PROT SERPL-MCNC: 6.3 G/DL (ref 6.4–8.2)
PSA SERPL-MCNC: <0.1 NG/ML
RBC # BLD AUTO: 5.01 X10*6/UL (ref 4.5–5.9)
SODIUM SERPL-SCNC: 143 MMOL/L (ref 136–145)
TESTOST SERPL-MCNC: <30 NG/DL (ref 240–1000)
WBC # BLD AUTO: 4.5 X10*3/UL (ref 4.4–11.3)

## 2024-01-16 PROCEDURE — 84153 ASSAY OF PSA TOTAL: CPT | Performed by: STUDENT IN AN ORGANIZED HEALTH CARE EDUCATION/TRAINING PROGRAM

## 2024-01-16 PROCEDURE — 36415 COLL VENOUS BLD VENIPUNCTURE: CPT

## 2024-01-16 PROCEDURE — 84403 ASSAY OF TOTAL TESTOSTERONE: CPT | Performed by: STUDENT IN AN ORGANIZED HEALTH CARE EDUCATION/TRAINING PROGRAM

## 2024-01-16 PROCEDURE — 85025 COMPLETE CBC W/AUTO DIFF WBC: CPT

## 2024-01-16 PROCEDURE — 80053 COMPREHEN METABOLIC PANEL: CPT | Performed by: STUDENT IN AN ORGANIZED HEALTH CARE EDUCATION/TRAINING PROGRAM

## 2024-01-24 ENCOUNTER — INFUSION (OUTPATIENT)
Dept: HEMATOLOGY/ONCOLOGY | Facility: HOSPITAL | Age: 69
End: 2024-01-24
Payer: MEDICARE

## 2024-01-24 ENCOUNTER — OFFICE VISIT (OUTPATIENT)
Dept: HEMATOLOGY/ONCOLOGY | Facility: HOSPITAL | Age: 69
End: 2024-01-24
Payer: MEDICARE

## 2024-01-24 VITALS
TEMPERATURE: 96.8 F | WEIGHT: 149.25 LBS | RESPIRATION RATE: 17 BRPM | OXYGEN SATURATION: 100 % | SYSTOLIC BLOOD PRESSURE: 137 MMHG | DIASTOLIC BLOOD PRESSURE: 75 MMHG | HEART RATE: 89 BPM | BODY MASS INDEX: 22.04 KG/M2

## 2024-01-24 DIAGNOSIS — C61 PROSTATE CANCER (MULTI): Primary | ICD-10-CM

## 2024-01-24 DIAGNOSIS — C61 PROSTATE CANCER (MULTI): ICD-10-CM

## 2024-01-24 PROCEDURE — 3078F DIAST BP <80 MM HG: CPT | Performed by: NURSE PRACTITIONER

## 2024-01-24 PROCEDURE — 96402 CHEMO HORMON ANTINEOPL SQ/IM: CPT

## 2024-01-24 PROCEDURE — 1126F AMNT PAIN NOTED NONE PRSNT: CPT | Performed by: NURSE PRACTITIONER

## 2024-01-24 PROCEDURE — 3075F SYST BP GE 130 - 139MM HG: CPT | Performed by: NURSE PRACTITIONER

## 2024-01-24 PROCEDURE — 1160F RVW MEDS BY RX/DR IN RCRD: CPT | Performed by: NURSE PRACTITIONER

## 2024-01-24 PROCEDURE — 96372 THER/PROPH/DIAG INJ SC/IM: CPT | Performed by: STUDENT IN AN ORGANIZED HEALTH CARE EDUCATION/TRAINING PROGRAM

## 2024-01-24 PROCEDURE — 99215 OFFICE O/P EST HI 40 MIN: CPT | Performed by: NURSE PRACTITIONER

## 2024-01-24 PROCEDURE — 2500000004 HC RX 250 GENERAL PHARMACY W/ HCPCS (ALT 636 FOR OP/ED): Mod: JZ | Performed by: STUDENT IN AN ORGANIZED HEALTH CARE EDUCATION/TRAINING PROGRAM

## 2024-01-24 PROCEDURE — 1036F TOBACCO NON-USER: CPT | Performed by: NURSE PRACTITIONER

## 2024-01-24 PROCEDURE — 1159F MED LIST DOCD IN RCRD: CPT | Performed by: NURSE PRACTITIONER

## 2024-01-24 RX ORDER — EPINEPHRINE 0.3 MG/.3ML
0.3 INJECTION SUBCUTANEOUS EVERY 5 MIN PRN
OUTPATIENT
Start: 2024-04-09

## 2024-01-24 RX ORDER — DIPHENHYDRAMINE HYDROCHLORIDE 50 MG/ML
50 INJECTION INTRAMUSCULAR; INTRAVENOUS AS NEEDED
OUTPATIENT
Start: 2024-04-09

## 2024-01-24 RX ORDER — FAMOTIDINE 10 MG/ML
20 INJECTION INTRAVENOUS ONCE AS NEEDED
OUTPATIENT
Start: 2024-04-09

## 2024-01-24 RX ORDER — ALBUTEROL SULFATE 0.83 MG/ML
3 SOLUTION RESPIRATORY (INHALATION) AS NEEDED
OUTPATIENT
Start: 2024-04-09

## 2024-01-24 RX ADMIN — TRIPTORELIN PAMOATE 11.25 MG: KIT at 09:44

## 2024-01-24 ASSESSMENT — PAIN SCALES - GENERAL: PAINLEVEL: 0-NO PAIN

## 2024-01-24 NOTE — PROGRESS NOTES
Patient ID: Man Flannery is a 68 y.o. male.  Attending Physician: Dr. Dom Jules  Cancer Diagnosis:  Cancer Staging   No matching staging information was found for the patient.     Current Therapy: ADT (Trelstart IM q12mos)  Abiraterone Acetate 1000 mg PO daily  Prednisone 5 mg PO daily    Subjective  1    2/5/19: Dx intermediate prostate ca ([place] 3+4=7/iPSA 9.98/ GG2)    8/28/20: completed EBRT + 6 months ADT    9/25/20: PSA kimmy 0.42    4/5/21: PSA 1.24    3/10/22: PSA 1.44    9/14/22: PSA 1.65    3/15/23: PSA 2.35    4/25/23: PSMA PET with avid pre-sacral LNs    5/18/23: Start ADT (Trelstar)    6/1/23: Start abiraterone + prednisone    8/4/23: Elevated LFT's- grade 1- continue abiraterone/prednisone, ADT    8/16/23: Started radiation    Cancer History:  Oncology History   Prostate cancer (CMS/HCC)   5/10/2023 Initial Diagnosis    Prostate cancer (CMS/HCC)     6/1/2023 -  Chemotherapy    Abiraterone / PredniSONE, 84 Day Cycles         Interval History:  Man is doing well. Hot flashes have dissipated. No new or concerning symptoms. Feels great. The remainder of his ROS is otherwise negative.    HPI    Objective    BSA: 1.82 meters squared  /75   Pulse 89   Temp 36 °C (96.8 °F)   Resp 17   Wt 67.7 kg (149 lb 4 oz)   SpO2 100%   BMI 22.04 kg/m²     Physical Exam  PHYSICAL EXAM:   General: alert, well-dressed in NAD. Speech is fluent and coherent, words clear. Good insight. Oriented x4  Skin: warm, dry, and pink without cyanosis or nail clubbing. No rash, petechiae, or ecchymoses  HEENT: Normocephalic atraumatic. Sclera white, conjunctiva pink. EOMs intact. Hearing intact to spoken voice. No visible lesions  Respiratory: Chest expansion symmetric. No audible wheeze. Unlabored breathing.  CV: Good color  Psych: engaged, polite, appropriate conversation and eye contact.      Current Medications:    Current Outpatient Medications:     abiraterone (Zytiga) 250 mg tablet, Take 4 tablets (1,000 mg total)  by mouth once daily.  Swallow whole. Do not eat 2 hrs before or 1 hr after., Disp: 240 tablet, Rfl: 11    docusate sodium (Colace) 50 mg capsule, Take 1 capsule (50 mg) by mouth 2 times a day as needed for constipation., Disp: , Rfl:     Epifoam 1-1 % foam, 2 times a day as needed., Disp: , Rfl:     multivitamin (Daily Multi-Vitamin) tablet, Take 1 tablet by mouth once daily., Disp: , Rfl:     predniSONE (Deltasone) 5 mg tablet, Take 1 tablet (5 mg) by mouth once daily. Take with food., Disp: 60 tablet, Rfl: 11     Most Recent Labs:  Results for orders placed or performed in visit on 01/16/24   CBC and Auto Differential   Result Value Ref Range    WBC 4.5 4.4 - 11.3 x10*3/uL    nRBC      RBC 5.01 4.50 - 5.90 x10*6/uL    Hemoglobin 14.9 13.5 - 17.5 g/dL    Hematocrit 44.7 41.0 - 52.0 %    MCV 89 80 - 100 fL    MCH 29.7 26.0 - 34.0 pg    MCHC 33.3 32.0 - 36.0 g/dL    RDW 12.1 11.5 - 14.5 %    Platelets 260 150 - 450 x10*3/uL    Neutrophils % 58.6 40.0 - 80.0 %    Immature Granulocytes %, Automated 0.2 0.0 - 0.9 %    Lymphocytes % 24.3 13.0 - 44.0 %    Monocytes % 12.5 2.0 - 10.0 %    Eosinophils % 4.0 0.0 - 6.0 %    Basophils % 0.4 0.0 - 2.0 %    Neutrophils Absolute 2.62 1.20 - 7.70 x10*3/uL    Immature Granulocytes Absolute, Automated 0.01 0.00 - 0.70 x10*3/uL    Lymphocytes Absolute 1.09 (L) 1.20 - 4.80 x10*3/uL    Monocytes Absolute 0.56 0.10 - 1.00 x10*3/uL    Eosinophils Absolute 0.18 0.00 - 0.70 x10*3/uL    Basophils Absolute 0.02 0.00 - 0.10 x10*3/uL   Comprehensive metabolic panel   Result Value Ref Range    Glucose 95 74 - 99 mg/dL    Sodium 143 136 - 145 mmol/L    Potassium 4.3 3.5 - 5.3 mmol/L    Chloride 105 98 - 107 mmol/L    Bicarbonate 31 21 - 32 mmol/L    Anion Gap 11 10 - 20 mmol/L    Urea Nitrogen 18 6 - 23 mg/dL    Creatinine 0.94 0.50 - 1.30 mg/dL    eGFR 88 >60 mL/min/1.73m*2    Calcium 10.1 8.6 - 10.6 mg/dL    Albumin 4.0 3.4 - 5.0 g/dL    Alkaline Phosphatase 45 33 - 136 U/L    Total Protein  6.3 (L) 6.4 - 8.2 g/dL    AST 20 9 - 39 U/L    Bilirubin, Total 0.7 0.0 - 1.2 mg/dL    ALT 24 10 - 52 U/L   PSA   Result Value Ref Range    Prostate Specific AG <0.10 <=4.00 ng/mL   Testosterone   Result Value Ref Range    Testosterone <30 (L) 240 - 1,000 ng/dL      Lab Results   Component Value Date    PSA <0.10 01/16/2024    PSA <0.10 10/24/2023    PSA <0.10 08/03/2023        Performance Status:  Asymptomatic  ECOG Score: 0- Fully active, able to carry on all pre-disease performance w/o restriction.  Karnofsky Score: 100 - Fully active, able to carry on all pre-disease performed without restriction      Assessment/Plan   Man Flannery is a 68 y.o. male with a history of intermediate risk (PSA 9.98 / [place] 3+4=7 / GG2) localized prostate cancer s/p EBRT + ADT (completed 8/28/20) who presents with rising PSA and avid pre-sacral LNs on PSMA PET consistent with recurrent prostate cancer.     He started ADT and AA/P with good PSA response. However, LFTs were noted to be elevated. They have since normalized. Recent CT scan showed concern for adrenal mass and liver mass, recommended MRI abdomen, which I ordered today.     # Recurrent prostate cancer  - Follows with radiation oncology (Dr. Ordoñez)  - Continue ADT every 3 months- due today, due to complete April 2024  - Continue abiraterone + prednisone  - Plan for 1 year total course of treatment    #Elevated LFTs, resolved  - Continue to monitor    #Bone Health  - Ca/Vit D, continue with multivitamins/milk  - encourage regular physical activity    RTC at 1 year kassie in April    Total time spent on this encounter was 45 minutes, which included preparation, direct time with patient, documentation, and care coordination on the day of visit.    Doris Michelle, MSN, APRN, AGNP-C, AOCNP  Associate Nurse Practitioner  [facility] [place], [facility]

## 2024-01-29 DIAGNOSIS — E27.8 ADRENAL INCIDENTALOMA (MULTI): Primary | ICD-10-CM

## 2024-02-01 ENCOUNTER — LAB (OUTPATIENT)
Dept: LAB | Facility: LAB | Age: 69
End: 2024-02-01
Payer: MEDICARE

## 2024-02-01 DIAGNOSIS — E27.8 ADRENAL INCIDENTALOMA (MULTI): ICD-10-CM

## 2024-02-01 PROCEDURE — 36415 COLL VENOUS BLD VENIPUNCTURE: CPT

## 2024-02-01 PROCEDURE — 82384 ASSAY THREE CATECHOLAMINES: CPT

## 2024-02-01 PROCEDURE — 83835 ASSAY OF METANEPHRINES: CPT

## 2024-02-05 LAB
ANNOTATION COMMENT IMP: ABNORMAL
METANEPHS SERPL-SCNC: 0.33 NMOL/L (ref 0–0.49)
NORMETANEPH FREE SERPL-SCNC: 1.01 NMOL/L (ref 0–0.89)

## 2024-02-07 DIAGNOSIS — C61 PROSTATE CANCER (MULTI): Primary | ICD-10-CM

## 2024-02-07 LAB
DOPAMINE SERPL-MCNC: <30 PG/ML (ref 0–48)
EPINEPH PLAS-MCNC: 70 PG/ML (ref 0–62)
NOREPINEPH PLAS-MCNC: 810 PG/ML (ref 0–874)

## 2024-02-09 ENCOUNTER — HOSPITAL ENCOUNTER (OUTPATIENT)
Dept: RADIOLOGY | Facility: CLINIC | Age: 69
Discharge: HOME | End: 2024-02-09
Payer: MEDICARE

## 2024-02-09 VITALS — HEIGHT: 69 IN | WEIGHT: 149.25 LBS | BODY MASS INDEX: 22.11 KG/M2

## 2024-02-09 DIAGNOSIS — C61 PROSTATE CANCER (MULTI): ICD-10-CM

## 2024-02-09 PROCEDURE — 74183 MRI ABD W/O CNTR FLWD CNTR: CPT | Performed by: STUDENT IN AN ORGANIZED HEALTH CARE EDUCATION/TRAINING PROGRAM

## 2024-02-09 PROCEDURE — 74183 MRI ABD W/O CNTR FLWD CNTR: CPT

## 2024-02-09 PROCEDURE — A9575 INJ GADOTERATE MEGLUMI 0.1ML: HCPCS | Performed by: NURSE PRACTITIONER

## 2024-02-09 PROCEDURE — 2550000001 HC RX 255 CONTRASTS: Performed by: NURSE PRACTITIONER

## 2024-02-09 RX ORDER — GADOTERATE MEGLUMINE 376.9 MG/ML
14 INJECTION INTRAVENOUS
Status: COMPLETED | OUTPATIENT
Start: 2024-02-09 | End: 2024-02-09

## 2024-02-09 RX ADMIN — GADOTERATE MEGLUMINE 14 ML: 376.9 INJECTION INTRAVENOUS at 11:23

## 2024-02-12 ENCOUNTER — TELEMEDICINE (OUTPATIENT)
Dept: PRIMARY CARE | Facility: CLINIC | Age: 69
End: 2024-02-12
Payer: MEDICARE

## 2024-02-12 ENCOUNTER — TELEPHONE (OUTPATIENT)
Dept: PRIMARY CARE | Facility: CLINIC | Age: 69
End: 2024-02-12

## 2024-02-12 DIAGNOSIS — U07.1 COVID-19: Primary | ICD-10-CM

## 2024-02-12 PROCEDURE — 1126F AMNT PAIN NOTED NONE PRSNT: CPT | Performed by: STUDENT IN AN ORGANIZED HEALTH CARE EDUCATION/TRAINING PROGRAM

## 2024-02-12 PROCEDURE — 99214 OFFICE O/P EST MOD 30 MIN: CPT | Performed by: STUDENT IN AN ORGANIZED HEALTH CARE EDUCATION/TRAINING PROGRAM

## 2024-02-12 PROCEDURE — 1036F TOBACCO NON-USER: CPT | Performed by: STUDENT IN AN ORGANIZED HEALTH CARE EDUCATION/TRAINING PROGRAM

## 2024-02-12 PROCEDURE — 1159F MED LIST DOCD IN RCRD: CPT | Performed by: STUDENT IN AN ORGANIZED HEALTH CARE EDUCATION/TRAINING PROGRAM

## 2024-02-12 NOTE — PROGRESS NOTES
Subjective   Patient ID: Man Flannery is a 68 y.o. male who presents for No chief complaint on file..    HPI  Re: COVID - Patient presenting today and is COVID positive with symptoms that started less than 5 days ago; this patient is experiencing with mild-mod symptoms, at home, not on oxygen. Given this patient's comorbidities and age this patient meets criteria for Paxlovid. This is dosed based on renal function.      Review of Systems    Objective   There were no vitals taken for this visit.    Physical Exam  Gen: Well appearing, AAO x 3.   HEENT: NC/AT. Symmetric pupils on webcam.   Pulm: no evidence of increased work of breathing on webcam  Skin: no blemishes or rashes on webcam       Assessment/Plan   Patient presenting today and is COVID positive with symptoms that started less than 5 days ago; this patient is experiencing with mild-mod symptoms, at home, not on oxygen. Given this patient's comorbidities and age this patient meets criteria for Paxlovid. This is dosed based on renal function.    I have e-scripted this in to the patient's preferred pharmacy. Patient is to isolate. All questions and concerns were addressed.

## 2024-02-13 ENCOUNTER — TELEMEDICINE (OUTPATIENT)
Dept: HEMATOLOGY/ONCOLOGY | Facility: HOSPITAL | Age: 69
End: 2024-02-13
Payer: MEDICARE

## 2024-02-13 DIAGNOSIS — C61 PROSTATE CANCER (MULTI): ICD-10-CM

## 2024-02-13 PROCEDURE — 1036F TOBACCO NON-USER: CPT | Performed by: NURSE PRACTITIONER

## 2024-02-13 PROCEDURE — 1159F MED LIST DOCD IN RCRD: CPT | Performed by: NURSE PRACTITIONER

## 2024-02-13 PROCEDURE — 1126F AMNT PAIN NOTED NONE PRSNT: CPT | Performed by: NURSE PRACTITIONER

## 2024-02-13 PROCEDURE — 99442 PR PHYS/QHP TELEPHONE EVALUATION 11-20 MIN: CPT | Performed by: NURSE PRACTITIONER

## 2024-02-13 NOTE — PROGRESS NOTES
Patient ID: Man Flannery is a 68 y.o. male.  Attending Physician: Dr. Dom Jules  Cancer Diagnosis:  Cancer Staging   No matching staging information was found for the patient.     Current Therapy: ADT (Trelstart IM q12mos)  Abiraterone Acetate 1000 mg PO daily  Prednisone 5 mg PO daily    Subjective     2/5/19: Dx intermediate prostate ca ([place] 3+4=7/iPSA 9.98/ GG2)    8/28/20: completed EBRT + 6 months ADT    9/25/20: PSA kimmy 0.42    4/5/21: PSA 1.24    3/10/22: PSA 1.44    9/14/22: PSA 1.65    3/15/23: PSA 2.35    4/25/23: PSMA PET with avid pre-sacral LNs    5/18/23: Start ADT (Trelstar)    6/1/23: Start abiraterone + prednisone    8/4/23: Elevated LFT's- grade 1- continue abiraterone/prednisone, ADT    8/16/23: Started radiation.    Cancer History:  Oncology History   Prostate cancer (CMS/HCC)   5/10/2023 Initial Diagnosis    Prostate cancer (CMS/[facility])     6/1/2023 -  Chemotherapy    Abiraterone / PredniSONE, 84 Day Cycles         Interval History:  Man is doing well. Tested positive for COVID yesterday. Mild sore throat and rhinitis. Started Paxlovid. No major symptoms. No other changes.    HPI    Objective    BSA: There is no height or weight on file to calculate BSA.  There were no vitals taken for this visit.    Physical Exam  PHYSICAL EXAM:   General: alert, well-dressed in NAD. Speech is fluent and coherent, words clear. Good insight. Oriented x4  Skin: warm, dry, and pink without cyanosis or nail clubbing. No rash, petechiae, or ecchymoses  HEENT: Normocephalic atraumatic. Sclera white, conjunctiva pink. EOMs intact. Hearing intact to spoken voice. No visible lesions  Respiratory: Chest expansion symmetric. No audible wheeze. Unlabored breathing.  CV: Good color  Psych: engaged, polite, appropriate conversation and eye contact.      Current Medications:    Current Outpatient Medications:     abiraterone (Zytiga) 250 mg tablet, Take 4 tablets (1,000 mg total) by mouth once daily.  Swallow whole.  Do not eat 2 hrs before or 1 hr after., Disp: 240 tablet, Rfl: 11    docusate sodium (Colace) 50 mg capsule, Take 1 capsule (50 mg) by mouth 2 times a day as needed for constipation., Disp: , Rfl:     Epifoam 1-1 % foam, 2 times a day as needed., Disp: , Rfl:     multivitamin (Daily Multi-Vitamin) tablet, Take 1 tablet by mouth once daily., Disp: , Rfl:     nirmatrelvir-ritonavir (PAXLOVID) 300 mg (150 mg x 2)-100 mg tablet therapy pack, Take 3 tablets by mouth 2 times a day for 5 days. Follow the instructions on the package, Disp: 30 tablet, Rfl: 0    predniSONE (Deltasone) 5 mg tablet, Take 1 tablet (5 mg) by mouth once daily. Take with food., Disp: 60 tablet, Rfl: 11     Most Recent Labs:  Results for orders placed or performed in visit on 02/01/24   Metanephrines Plasma   Result Value Ref Range    Normetanephrine 1.01 (H) 0.00 - 0.89 nmol/L    Metanephrine 0.33 0.00 - 0.49 nmol/L    Metanephrines Interpretation See Note    Catecholamines, Fractionated, Plasma   Result Value Ref Range    Epinephrine 70 (H) 0 - 62 pg/mL    Norepinephrine 810 0 - 874 pg/mL    Dopamine <30 0 - 48 pg/mL      Lab Results   Component Value Date    PSA <0.10 01/16/2024    PSA <0.10 10/24/2023    PSA <0.10 08/03/2023        Performance Status:  Asymptomatic  ECOG Score: 0- Fully active, able to carry on all pre-disease performance w/o restriction.  Karnofsky Score: 100 - Fully active, able to carry on all pre-disease performed without restriction      Assessment/Plan   Man Flannery is a 68 y.o. male with a history of intermediate risk (PSA 9.98 / Mary 3+4=7 / GG2) localized prostate cancer s/p EBRT + ADT (completed 8/28/20) who presents with rising PSA and avid pre-sacral LNs on PSMA PET consistent with recurrent prostate cancer.     He started ADT and AA/P with good PSA response. However, LFTs were noted to be elevated. They have since normalized. Recent CT scan showed concern for adrenal mass and liver mass, recommended MRI  abdomen. Metanephrines and epinephrine very mildly elevated. MRI shows adrenal lesion more likely adenoma and liver benign vascular shunt vs hemangioma. These are not concerning for pheochromocytoma and were reviewed with Dr. Jules. Recommended to continue to monitor. He remains asymptomatic.    # Recurrent prostate cancer  - Follows with radiation oncology (Dr. Ordoñez)  - Continue ADT every 3 months- due to complete April 2024  - Continue abiraterone + prednisone  - Plan for 1 year total course of treatment    # Adrenal lesion, likely adenoma  - Monitor    #Elevated LFTs, resolved  - Continue to monitor    #Bone Health  - Ca/Vit D, continue with multivitamins/milk  - encourage regular physical activity    RTC at 1 year kassie in April    Phone visit lasted 20 mins    Doris Mihcelle, MSN, APRN, AGNP-C, AOCNP  Associate Nurse Practitioner  [facility], [facility]

## 2024-02-26 ENCOUNTER — SPECIALTY PHARMACY (OUTPATIENT)
Dept: PHARMACY | Facility: CLINIC | Age: 69
End: 2024-02-26

## 2024-02-27 ENCOUNTER — PHARMACY VISIT (OUTPATIENT)
Dept: PHARMACY | Facility: CLINIC | Age: 69
End: 2024-02-27
Payer: COMMERCIAL

## 2024-02-27 PROCEDURE — RXMED WILLOW AMBULATORY MEDICATION CHARGE

## 2024-03-23 ENCOUNTER — SPECIALTY PHARMACY (OUTPATIENT)
Dept: PHARMACY | Facility: CLINIC | Age: 69
End: 2024-03-23

## 2024-04-10 ENCOUNTER — LAB (OUTPATIENT)
Dept: LAB | Facility: CLINIC | Age: 69
End: 2024-04-10
Payer: MEDICARE

## 2024-04-10 DIAGNOSIS — C61 PROSTATE CANCER (MULTI): ICD-10-CM

## 2024-04-10 LAB
ALBUMIN SERPL BCP-MCNC: 4 G/DL (ref 3.4–5)
ALP SERPL-CCNC: 48 U/L (ref 33–136)
ALT SERPL W P-5'-P-CCNC: 22 U/L (ref 10–52)
ANION GAP SERPL CALC-SCNC: 7 MMOL/L (ref 10–20)
AST SERPL W P-5'-P-CCNC: 21 U/L (ref 9–39)
BASOPHILS # BLD AUTO: 0.03 X10*3/UL (ref 0–0.1)
BASOPHILS NFR BLD AUTO: 0.7 %
BILIRUB SERPL-MCNC: 0.7 MG/DL (ref 0–1.2)
BUN SERPL-MCNC: 21 MG/DL (ref 6–23)
CALCIUM SERPL-MCNC: 9.8 MG/DL (ref 8.6–10.6)
CHLORIDE SERPL-SCNC: 105 MMOL/L (ref 98–107)
CO2 SERPL-SCNC: 36 MMOL/L (ref 21–32)
CREAT SERPL-MCNC: 0.89 MG/DL (ref 0.5–1.3)
EGFRCR SERPLBLD CKD-EPI 2021: >90 ML/MIN/1.73M*2
EOSINOPHIL # BLD AUTO: 0.26 X10*3/UL (ref 0–0.7)
EOSINOPHIL NFR BLD AUTO: 5.8 %
ERYTHROCYTE [DISTWIDTH] IN BLOOD BY AUTOMATED COUNT: 12.4 % (ref 11.5–14.5)
GLUCOSE SERPL-MCNC: 85 MG/DL (ref 74–99)
HCT VFR BLD AUTO: 42.9 % (ref 41–52)
HGB BLD-MCNC: 14 G/DL (ref 13.5–17.5)
IMM GRANULOCYTES # BLD AUTO: 0.01 X10*3/UL (ref 0–0.7)
IMM GRANULOCYTES NFR BLD AUTO: 0.2 % (ref 0–0.9)
LYMPHOCYTES # BLD AUTO: 1.06 X10*3/UL (ref 1.2–4.8)
LYMPHOCYTES NFR BLD AUTO: 23.6 %
MCH RBC QN AUTO: 29.9 PG (ref 26–34)
MCHC RBC AUTO-ENTMCNC: 32.6 G/DL (ref 32–36)
MCV RBC AUTO: 92 FL (ref 80–100)
MONOCYTES # BLD AUTO: 0.52 X10*3/UL (ref 0.1–1)
MONOCYTES NFR BLD AUTO: 11.6 %
NEUTROPHILS # BLD AUTO: 2.62 X10*3/UL (ref 1.2–7.7)
NEUTROPHILS NFR BLD AUTO: 58.1 %
NRBC BLD-RTO: ABNORMAL /100{WBCS}
PLATELET # BLD AUTO: 262 X10*3/UL (ref 150–450)
POTASSIUM SERPL-SCNC: 4.2 MMOL/L (ref 3.5–5.3)
PROT SERPL-MCNC: 6.2 G/DL (ref 6.4–8.2)
PSA SERPL-MCNC: <0.1 NG/ML
RBC # BLD AUTO: 4.69 X10*6/UL (ref 4.5–5.9)
SODIUM SERPL-SCNC: 144 MMOL/L (ref 136–145)
TESTOST SERPL-MCNC: <30 NG/DL (ref 240–1000)
WBC # BLD AUTO: 4.5 X10*3/UL (ref 4.4–11.3)

## 2024-04-10 PROCEDURE — 85025 COMPLETE CBC W/AUTO DIFF WBC: CPT

## 2024-04-10 PROCEDURE — 36415 COLL VENOUS BLD VENIPUNCTURE: CPT

## 2024-04-10 PROCEDURE — 80053 COMPREHEN METABOLIC PANEL: CPT | Performed by: STUDENT IN AN ORGANIZED HEALTH CARE EDUCATION/TRAINING PROGRAM

## 2024-04-10 PROCEDURE — 84153 ASSAY OF PSA TOTAL: CPT | Performed by: STUDENT IN AN ORGANIZED HEALTH CARE EDUCATION/TRAINING PROGRAM

## 2024-04-10 PROCEDURE — 84403 ASSAY OF TOTAL TESTOSTERONE: CPT | Performed by: STUDENT IN AN ORGANIZED HEALTH CARE EDUCATION/TRAINING PROGRAM

## 2024-04-16 ENCOUNTER — APPOINTMENT (OUTPATIENT)
Dept: HEMATOLOGY/ONCOLOGY | Facility: HOSPITAL | Age: 69
End: 2024-04-16
Payer: MEDICARE

## 2024-04-17 ENCOUNTER — OFFICE VISIT (OUTPATIENT)
Dept: HEMATOLOGY/ONCOLOGY | Facility: HOSPITAL | Age: 69
End: 2024-04-17
Payer: MEDICARE

## 2024-04-17 ENCOUNTER — APPOINTMENT (OUTPATIENT)
Dept: HEMATOLOGY/ONCOLOGY | Facility: HOSPITAL | Age: 69
End: 2024-04-17
Payer: MEDICARE

## 2024-04-17 ENCOUNTER — SPECIALTY PHARMACY (OUTPATIENT)
Dept: PHARMACY | Facility: CLINIC | Age: 69
End: 2024-04-17

## 2024-04-17 VITALS
SYSTOLIC BLOOD PRESSURE: 173 MMHG | RESPIRATION RATE: 16 BRPM | WEIGHT: 150.79 LBS | BODY MASS INDEX: 22.26 KG/M2 | TEMPERATURE: 97.3 F | OXYGEN SATURATION: 98 % | HEART RATE: 90 BPM | DIASTOLIC BLOOD PRESSURE: 91 MMHG

## 2024-04-17 DIAGNOSIS — C61 PROSTATE CANCER (MULTI): Primary | ICD-10-CM

## 2024-04-17 DIAGNOSIS — D35.00 ADRENAL ADENOMA, UNSPECIFIED LATERALITY: ICD-10-CM

## 2024-04-17 DIAGNOSIS — Z79.818 ANDROGEN DEPRIVATION THERAPY: ICD-10-CM

## 2024-04-17 PROCEDURE — 1159F MED LIST DOCD IN RCRD: CPT | Performed by: STUDENT IN AN ORGANIZED HEALTH CARE EDUCATION/TRAINING PROGRAM

## 2024-04-17 PROCEDURE — 99215 OFFICE O/P EST HI 40 MIN: CPT | Performed by: STUDENT IN AN ORGANIZED HEALTH CARE EDUCATION/TRAINING PROGRAM

## 2024-04-17 PROCEDURE — 3077F SYST BP >= 140 MM HG: CPT | Performed by: STUDENT IN AN ORGANIZED HEALTH CARE EDUCATION/TRAINING PROGRAM

## 2024-04-17 PROCEDURE — 1126F AMNT PAIN NOTED NONE PRSNT: CPT | Performed by: STUDENT IN AN ORGANIZED HEALTH CARE EDUCATION/TRAINING PROGRAM

## 2024-04-17 PROCEDURE — 3080F DIAST BP >= 90 MM HG: CPT | Performed by: STUDENT IN AN ORGANIZED HEALTH CARE EDUCATION/TRAINING PROGRAM

## 2024-04-17 ASSESSMENT — PAIN SCALES - GENERAL: PAINLEVEL: 0-NO PAIN

## 2024-04-18 DIAGNOSIS — C61 PROSTATE CANCER (MULTI): ICD-10-CM

## 2024-04-18 RX ORDER — PREDNISONE 5 MG/1
5 TABLET ORAL DAILY
Qty: 60 TABLET | Refills: 11 | Status: CANCELLED | OUTPATIENT
Start: 2024-04-18

## 2024-04-18 RX ORDER — ABIRATERONE ACETATE 250 MG/1
1000 TABLET ORAL DAILY
Qty: 240 TABLET | Refills: 11 | Status: CANCELLED | OUTPATIENT
Start: 2024-04-18

## 2024-04-18 NOTE — PROGRESS NOTES
Oncology Return Visit      Patient ID: Man Flannery is a 68 y.o. male who presents for follow up of prostate cancer  Primary Care Provider: Seferino Sandy MD    Patient Timeline    Date  Event    2/5/19  Prostate biopsy - intermediate prostate ca ([place] 3+4=7/iPSA 9.98/ GG2)     8/28/20 Completed EBRT + 6 months ADT     9/25/20 PSA kimmy 0.42     4/5/21  PSA 1.24     3/10/22 PSA 1.44     9/14/22 PSA 1.65     3/15/23 PSA 2.35     4/25/23 PSMA PET with avid pre-sacral LNs     5/18/23 Start ADT (Trelstar)     6/1/23  Start abiraterone + prednisone     8/4/23  Elevated LFT's- grade 1- continue abiraterone/prednisone, ADT     8/16/23 Started radiation to pelvic lymph nodes    4/17/24 Discontinue ADT, Abiraterone/prednisone     Cancer Staging   Prostate cancer (Multi)  Staging form: Prostate, AJCC 8th Edition  - Clinical: Stage KELL (cT2, cN1, cM0) - Signed by Dom Jules MD PhD on 4/18/2024        HPI    Man Flannery presents accompanied by his wife. He feels well. He has been having fewer night sweats and says that he has not had hot flashes in months. He does not have fatigue with the exception of when he had COVID. He has good appetite. He reports no fevers, chills, night sweats, dyspnea, chest pain, abdominal pain, nausea, vomiting, diarrhea, constipation, extremity weakness, neuropathy, skin changes/rash, easy bleeding/bruising, vision changes, or headaches.      ROS    A 14 point review of systems was performed and is negative unless otherwise stated in the HPI    PMH    Past Medical History:   Diagnosis Date    Dysphagia, unspecified 10/03/2014    Pill dysphagia        Medications    Current Outpatient Medications   Medication Instructions    abiraterone (ZYTIGA) 1,000 mg, oral, Daily, Swallow whole. Do not eat 2 hrs before or 1 hr after.    docusate sodium (Colace) 50 mg capsule 1 capsule, oral, 2 times daily PRN    Epifoam 1-1 % foam 2 times a day as needed.    multivitamin (Daily Multi-Vitamin)  tablet 1 tablet, oral, Daily    predniSONE (DELTASONE) 5 mg, oral, Daily, Take with food.        Fam Hx    Family History   Problem Relation Name Age of Onset    Hypertension Mother         Social Hx    Lives with wife in house in [place]. Used to work as a .   Man Flannery  reports that he has never smoked. He has never used smokeless tobacco.  He  reports current alcohol use of about 7.0 standard drinks of alcohol per week.  He  reports no history of drug use.    Objective     Physical Examination    BP (!) 173/91   Pulse 90   Temp 36.3 °C (97.3 °F)   Resp 16   Wt 68.4 kg (150 lb 12.7 oz)   SpO2 98%   BMI 22.26 kg/m²   BSA: 1.83 meters squared    Performance Status:  Asymptomatic (ECO)    Physical Exam    GENERAL: Well appearing, in no apparent distress  HEENT: No cervical or supraclavicular adenopathy. Mucous membranes moist.  CV: Regular rate and rhythm, Normal S1, S2, no murmurs/rubs/gallops  RESP: Normal work of breathing, CTAB without wheeze or crackles  ABD: Normoactive bowel sounds. Soft, nontender, nondistended.  EXT: Warm and well perfused, no edema  NEURO: A&O x 3, normal gait  SKIN: No visible rashes or bruising.  PSYCH: Normal affect.    Results    Labs  Lab Results   Component Value Date    WBC 4.5 04/10/2024    HGB 14.0 04/10/2024    HCT 42.9 04/10/2024    MCV 92 04/10/2024     04/10/2024     Lab Results   Component Value Date    GLUCOSE 85 04/10/2024    CALCIUM 9.8 04/10/2024     04/10/2024    K 4.2 04/10/2024    CO2 36 (H) 04/10/2024     04/10/2024    BUN 21 04/10/2024    CREATININE 0.89 04/10/2024     Lab Results   Component Value Date    ALT 22 04/10/2024    AST 21 04/10/2024    ALKPHOS 48 04/10/2024    BILITOT 0.7 04/10/2024      PSA Trend  Lab Results   Component Value Date    PSA <0.10 04/10/2024    PSA <0.10 2024    PSA <0.10 10/24/2023     Testosterone < 30     Imaging    Imaging personally reviewed in EHR and summarized in prior notes. No  new imaging.    Genomics    Germline:  None    Somatic: None    Assessment/Plan    Man Flannery is a 68 y.o. year old male diagnosed with intermediate risk (iPSA 9.98, Mary GG2) localized prostate cancer s/p EBRT + ADT, completed in August 2020 who in 2023 presented with rising PSA and avid pre-sacral LNs on PSMA PET consistent with recurrent prostate cancer. He received ADT and abiraterone/prednisone for 1 year in addition to radiation to the pelvis.    Today, he presents having completed one year of treatment. Overall, he tolerated treatment well. We discussed discontinuation of abiraterone and prednisone and PSA surveillance.     # Recurrent prostate cancer  - Follows with radiation oncology (Dr. Ordoñez); s/p radiation to pelvis  - Discontinue ADT and abiraterone/prednisone; off treatment April 2024  - Check PSA and testosterone in 3 months     # Adrenal lesion, likely adenoma  - Monitor; repeat CT A/P in December 2024     #Elevated LFTs, resolved  - Continue to monitor     #Bone Health  - Ca/Vit D, continue with multivitamins/milk  - Encourage regular physical activity    The above plan was discussed with the patient and family, and they were in agreement. All questions were answered to their satisfaction.    RV 3 months with labs (CBC, CMP, PSA, Testosterone)    More than 40 minutes were spent in face-to-face encounter, review of medical records, coordination of care, and documentation.

## 2024-04-19 ENCOUNTER — SPECIALTY PHARMACY (OUTPATIENT)
Dept: PHARMACY | Facility: CLINIC | Age: 69
End: 2024-04-19

## 2024-06-04 ENCOUNTER — TELEPHONE (OUTPATIENT)
Dept: RADIATION ONCOLOGY | Facility: HOSPITAL | Age: 69
End: 2024-06-04
Payer: MEDICARE

## 2024-06-04 NOTE — TELEPHONE ENCOUNTER
Called pt. to remind of appointment on 6/12/2024 at 11:00 am with GURPREET Daugherty.  Pt answered and confirmed appointment.

## 2024-06-06 ENCOUNTER — APPOINTMENT (OUTPATIENT)
Dept: RADIATION ONCOLOGY | Facility: HOSPITAL | Age: 69
End: 2024-06-06
Payer: MEDICARE

## 2024-06-12 ENCOUNTER — APPOINTMENT (OUTPATIENT)
Dept: RADIATION ONCOLOGY | Facility: HOSPITAL | Age: 69
End: 2024-06-12
Payer: MEDICARE

## 2024-06-26 ENCOUNTER — APPOINTMENT (OUTPATIENT)
Dept: RADIATION ONCOLOGY | Facility: HOSPITAL | Age: 69
End: 2024-06-26
Payer: MEDICARE

## 2024-07-09 ENCOUNTER — LAB (OUTPATIENT)
Dept: LAB | Facility: CLINIC | Age: 69
End: 2024-07-09
Payer: MEDICARE

## 2024-07-09 DIAGNOSIS — C61 PROSTATE CANCER (MULTI): ICD-10-CM

## 2024-07-09 LAB
ALBUMIN SERPL BCP-MCNC: 4.2 G/DL (ref 3.4–5)
ALP SERPL-CCNC: 50 U/L (ref 33–136)
ALT SERPL W P-5'-P-CCNC: 15 U/L (ref 10–52)
ANION GAP SERPL CALC-SCNC: 10 MMOL/L (ref 10–20)
AST SERPL W P-5'-P-CCNC: 16 U/L (ref 9–39)
BASOPHILS # BLD AUTO: 0.04 X10*3/UL (ref 0–0.1)
BASOPHILS NFR BLD AUTO: 0.9 %
BILIRUB SERPL-MCNC: 0.6 MG/DL (ref 0–1.2)
BUN SERPL-MCNC: 20 MG/DL (ref 6–23)
CALCIUM SERPL-MCNC: 9.9 MG/DL (ref 8.6–10.6)
CHLORIDE SERPL-SCNC: 104 MMOL/L (ref 98–107)
CO2 SERPL-SCNC: 29 MMOL/L (ref 21–32)
CREAT SERPL-MCNC: 0.91 MG/DL (ref 0.5–1.3)
EGFRCR SERPLBLD CKD-EPI 2021: >90 ML/MIN/1.73M*2
EOSINOPHIL # BLD AUTO: 0.26 X10*3/UL (ref 0–0.7)
EOSINOPHIL NFR BLD AUTO: 6 %
ERYTHROCYTE [DISTWIDTH] IN BLOOD BY AUTOMATED COUNT: 12 % (ref 11.5–14.5)
GLUCOSE SERPL-MCNC: 98 MG/DL (ref 74–99)
HCT VFR BLD AUTO: 43 % (ref 41–52)
HGB BLD-MCNC: 14.4 G/DL (ref 13.5–17.5)
IMM GRANULOCYTES # BLD AUTO: 0.01 X10*3/UL (ref 0–0.7)
IMM GRANULOCYTES NFR BLD AUTO: 0.2 % (ref 0–0.9)
LYMPHOCYTES # BLD AUTO: 0.98 X10*3/UL (ref 1.2–4.8)
LYMPHOCYTES NFR BLD AUTO: 22.5 %
MCH RBC QN AUTO: 29.7 PG (ref 26–34)
MCHC RBC AUTO-ENTMCNC: 33.5 G/DL (ref 32–36)
MCV RBC AUTO: 89 FL (ref 80–100)
MONOCYTES # BLD AUTO: 0.49 X10*3/UL (ref 0.1–1)
MONOCYTES NFR BLD AUTO: 11.3 %
NEUTROPHILS # BLD AUTO: 2.57 X10*3/UL (ref 1.2–7.7)
NEUTROPHILS NFR BLD AUTO: 59.1 %
NRBC BLD-RTO: ABNORMAL /100{WBCS}
PLATELET # BLD AUTO: 243 X10*3/UL (ref 150–450)
POTASSIUM SERPL-SCNC: 4.4 MMOL/L (ref 3.5–5.3)
PROT SERPL-MCNC: 6.4 G/DL (ref 6.4–8.2)
PSA SERPL-MCNC: <0.1 NG/ML
RBC # BLD AUTO: 4.85 X10*6/UL (ref 4.5–5.9)
SODIUM SERPL-SCNC: 139 MMOL/L (ref 136–145)
TESTOST SERPL-MCNC: 44 NG/DL (ref 240–1000)
WBC # BLD AUTO: 4.4 X10*3/UL (ref 4.4–11.3)

## 2024-07-09 PROCEDURE — 84153 ASSAY OF PSA TOTAL: CPT | Performed by: STUDENT IN AN ORGANIZED HEALTH CARE EDUCATION/TRAINING PROGRAM

## 2024-07-09 PROCEDURE — 80053 COMPREHEN METABOLIC PANEL: CPT | Performed by: STUDENT IN AN ORGANIZED HEALTH CARE EDUCATION/TRAINING PROGRAM

## 2024-07-09 PROCEDURE — 84403 ASSAY OF TOTAL TESTOSTERONE: CPT | Performed by: STUDENT IN AN ORGANIZED HEALTH CARE EDUCATION/TRAINING PROGRAM

## 2024-07-09 PROCEDURE — 36415 COLL VENOUS BLD VENIPUNCTURE: CPT

## 2024-07-09 PROCEDURE — 85025 COMPLETE CBC W/AUTO DIFF WBC: CPT

## 2024-07-10 ENCOUNTER — APPOINTMENT (OUTPATIENT)
Dept: HEMATOLOGY/ONCOLOGY | Facility: HOSPITAL | Age: 69
End: 2024-07-10
Payer: MEDICARE

## 2024-07-15 ASSESSMENT — ENCOUNTER SYMPTOMS
CONSTIPATION: 0
WEAKNESS: 0
PSYCHIATRIC NEGATIVE: 1
JOINT SWELLING: 0
DIZZINESS: 0
CHEST TIGHTNESS: 0
ALLERGIC/IMMUNOLOGIC NEGATIVE: 1
FREQUENCY: 0
DIARRHEA: 0
ABDOMINAL PAIN: 0
PALPITATIONS: 0
FATIGUE: 0
SHORTNESS OF BREATH: 0
ANAL BLEEDING: 0
ARTHRALGIAS: 0
HEMATURIA: 0
BACK PAIN: 0
FEVER: 0
BLOOD IN STOOL: 0
RECTAL PAIN: 0
UNEXPECTED WEIGHT CHANGE: 0
COUGH: 0
DYSURIA: 0
DIFFICULTY URINATING: 0

## 2024-07-15 NOTE — PROGRESS NOTES
Cancer synopsis:  Rad/onc: Dr. Ordoñez/ Dat CNP  Med/onc: Dr. Jules/ Viviana CNP    67 yo M with a history of favorable intermediate prostate cancer (cT1c, iPSA 9.98, GG2 on 2/12 cores+,  intraductal carcinoma present) who completed 70Gy/28fx to the prostate+SV (completed 8/28/20) + short-course ADT, with a PSA kimmy of 0.42 on 9/25/2020. The patient had evidence of rising since 4/5/2021 (with PSA 1.24), and had evidence of biochemical  recurrence in 3/15/2023 with a PSA of 2.35. PSMA PET was completed on 4/25/2023 which showed evidence of pre-sacral LN with PSMA avidity, without any evidence of local recurrence in the previously treated areas.     5/18/23: Start ADT (Trelstar)     6/1/23: Start abiraterone + prednisone    08/11/2023: Rt to Pelvic LN    04/2024: Completed AA/p and ADT    Recent imaging:  MRI abd 02/2024: 1. A 2.5 cm left adrenal nodule, with MR characteristics compatible  with adenoma. (Monitor at this time)    History of presenting illness:    Patient ID: [id number]     Man Flannery is a 68 y.o. male who presents for his oligometastasic hormone sensitive prostate cancer now s/p RT and on current lng-term hormonal therapy.    RT Site: Prostate and SV 2020 and Pelivs LN 2023  RT Date: 07/22/2020 and 08/11/2023  Hormone therapy: Yes, completed Adt and AA/p on 04/2024  Hot Flushes: Admits to a few in the last month, however manageable  Fatigue: Denies  Bone pain: Denies  ED: Admits to loss of sexual function since   ED medications: No  IPSS: 6  Urinary symptoms: Admits to nocturia once a night. Denies hematuria or dysuria. Denies frequency, urgency or urine leakage.  Urinary Medications: No  Rectal bleeding: Denies  Colonoscopy: 06/2023: internal hemorrhoids noted and RT proctitis. Next due in 10yrs  Other systems: Denies SOB, CP or fever.    Review of systems:  Review of Systems   Constitutional:  Negative for fatigue, fever and unexpected weight change.   Respiratory:  Negative for cough, chest  tightness and shortness of breath.    Cardiovascular:  Negative for chest pain, palpitations and leg swelling.   Gastrointestinal:  Negative for abdominal pain, anal bleeding, blood in stool, constipation, diarrhea and rectal pain.   Endocrine: Negative for cold intolerance, heat intolerance and polyuria.   Genitourinary:  Negative for decreased urine volume, difficulty urinating, dysuria, frequency, hematuria and urgency.   Musculoskeletal:  Negative for arthralgias, back pain, gait problem and joint swelling.   Skin: Negative.    Allergic/Immunologic: Negative.    Neurological:  Negative for dizziness, syncope and weakness.   Psychiatric/Behavioral: Negative.         Past Medical history  Past Medical History:   Diagnosis Date    Dysphagia, unspecified 10/03/2014    Pill dysphagia        Surgical/family history  Family History   Problem Relation Name Age of Onset    Hypertension Mother        Past Surgical History:   Procedure Laterality Date    OTHER SURGICAL HISTORY  03/15/2014    Injection For Chemonucleolysis Of Disk, With Discography    TONSILLECTOMY  09/24/2013    Tonsillectomy        Social History  Tobacco Use: Low Risk  (7/16/2024)    Patient History     Smoking Tobacco Use: Never     Smokeless Tobacco Use: Never     Passive Exposure: Not on file         Current med list:  Current Outpatient Medications   Medication Instructions    docusate sodium (Colace) 50 mg capsule 1 capsule, oral, 2 times daily PRN    Epifoam 1-1 % foam 2 times a day as needed.    multivitamin (Daily Multi-Vitamin) tablet 1 tablet, oral, Daily        Last recorded vital:  There were no vitals taken for this visit.    Physical exam  Physical Exam  Constitutional:       Appearance: Normal appearance.   Cardiovascular:      Rate and Rhythm: Normal rate.   Pulmonary:      Effort: Pulmonary effort is normal.      Breath sounds: Normal breath sounds.   Musculoskeletal:         General: Normal range of motion.      Cervical back: Normal  range of motion.   Neurological:      Mental Status: He is alert and oriented to person, place, and time.   Psychiatric:         Mood and Affect: Mood normal.         Behavior: Behavior normal.         Thought Content: Thought content normal.         Judgment: Judgment normal.         Pertinent labs:  Prostate Specific AG   Date/Time Value Ref Range Status   07/09/2024 08:49 AM <0.10 <=4.00 ng/mL Final     Dx:  Problem List Items Addressed This Visit       Prostate cancer (Multi)    Relevant Orders    Clinic Appointment Request Follow Up; NAZARIO SALAZAR; [facility] (Completed)     Other Visit Diagnoses       Malignant neoplasm of prostate (Multi)    -  Primary    Relevant Orders    Prostate Specific Antigen    Testosterone    Clinic Appointment Request Follow Up; NAZARIO SALAZAR (virtual); [facility] (virtual)        PSA of <0.10 was reviewed and is undetectable. Testosterone now recovering slowly and is 44. Review of latent SE including rectal bleeding, hematuria, urinary strictures, ED where reviewed as well as how to contact office if s/s present. Denies latent SE. NCCN guidelines where reviewed and routine FUV of every 3m for first year and every 6m for four years for a total of five years was discussed. Patient verbalized understanding.     Recommend vitamin D supplementation, start (or increase by) 400-1000 units daily. Reviewed importance of intake while on Testerone lowering therapy as well as after until Testerone re-establishes, at which point may stop if DEXA indicative of normal bone density. Also reviewed that individuals at a higher risk such as those over the age of 75 or those with a hx of bone fx, osteoporosis or osteopenia to continue supplementation indefinitely with routine DEXA imaging as per national guidelines to assess bone health continually.    PLAN:  FUV 6m  Labs per med/onc for systemic therapy  Imaging none  FUV other providers: Med/onc for systemic therapy, PCP for  routine evals    Please contact office with any concerns:  New Ramos CNP  [phone]

## 2024-07-16 ENCOUNTER — HOSPITAL ENCOUNTER (OUTPATIENT)
Dept: RADIATION ONCOLOGY | Facility: HOSPITAL | Age: 69
Setting detail: RADIATION/ONCOLOGY SERIES
Discharge: HOME | End: 2024-07-16
Payer: MEDICARE

## 2024-07-16 ENCOUNTER — OFFICE VISIT (OUTPATIENT)
Dept: HEMATOLOGY/ONCOLOGY | Facility: HOSPITAL | Age: 69
End: 2024-07-16
Payer: MEDICARE

## 2024-07-16 VITALS
TEMPERATURE: 97.9 F | BODY MASS INDEX: 22.58 KG/M2 | WEIGHT: 153 LBS | DIASTOLIC BLOOD PRESSURE: 72 MMHG | RESPIRATION RATE: 18 BRPM | OXYGEN SATURATION: 95 % | SYSTOLIC BLOOD PRESSURE: 137 MMHG | HEART RATE: 85 BPM

## 2024-07-16 DIAGNOSIS — C61 PROSTATE CANCER (MULTI): ICD-10-CM

## 2024-07-16 DIAGNOSIS — C61 MALIGNANT NEOPLASM OF PROSTATE (MULTI): Primary | ICD-10-CM

## 2024-07-16 PROCEDURE — 3078F DIAST BP <80 MM HG: CPT | Performed by: NURSE PRACTITIONER

## 2024-07-16 PROCEDURE — 99215 OFFICE O/P EST HI 40 MIN: CPT | Performed by: NURSE PRACTITIONER

## 2024-07-16 PROCEDURE — 99214 OFFICE O/P EST MOD 30 MIN: CPT

## 2024-07-16 PROCEDURE — 1159F MED LIST DOCD IN RCRD: CPT | Performed by: NURSE PRACTITIONER

## 2024-07-16 PROCEDURE — 3075F SYST BP GE 130 - 139MM HG: CPT | Performed by: NURSE PRACTITIONER

## 2024-07-16 PROCEDURE — 1126F AMNT PAIN NOTED NONE PRSNT: CPT | Performed by: NURSE PRACTITIONER

## 2024-07-16 PROCEDURE — 1036F TOBACCO NON-USER: CPT | Performed by: NURSE PRACTITIONER

## 2024-07-16 ASSESSMENT — COLUMBIA-SUICIDE SEVERITY RATING SCALE - C-SSRS
2. HAVE YOU ACTUALLY HAD ANY THOUGHTS OF KILLING YOURSELF?: NO
6. HAVE YOU EVER DONE ANYTHING, STARTED TO DO ANYTHING, OR PREPARED TO DO ANYTHING TO END YOUR LIFE?: NO
1. IN THE PAST MONTH, HAVE YOU WISHED YOU WERE DEAD OR WISHED YOU COULD GO TO SLEEP AND NOT WAKE UP?: NO

## 2024-07-16 ASSESSMENT — PATIENT HEALTH QUESTIONNAIRE - PHQ9
1. LITTLE INTEREST OR PLEASURE IN DOING THINGS: NOT AT ALL
2. FEELING DOWN, DEPRESSED OR HOPELESS: NOT AT ALL
SUM OF ALL RESPONSES TO PHQ9 QUESTIONS 1 AND 2: 0

## 2024-07-16 ASSESSMENT — PAIN SCALES - GENERAL: PAINLEVEL: 0-NO PAIN

## 2024-07-16 NOTE — PROGRESS NOTES
Patient ID: Man Flannery is a 68 y.o. male.  Attending Physician: Dr. Dom Jules  Cancer Diagnosis:  Cancer Staging   Prostate cancer (Multi)  Staging form: Prostate, AJCC 8th Edition  - Clinical: Stage KELL (cT2, cN1, cM0) - Signed by Dom Jules MD PhD on 4/18/2024       Current Therapy: ADT (Trelstart IM q12mos)  Abiraterone Acetate 1000 mg PO daily  Prednisone 5 mg PO daily    Subjective     2/5/19: Dx intermediate prostate ca ([place] 3+4=7/iPSA 9.98/ GG2)    8/28/20: completed EBRT + 6 months ADT    9/25/20: PSA kimmy 0.42    4/5/21: PSA 1.24    3/10/22: PSA 1.44    9/14/22: PSA 1.65    3/15/23: PSA 2.35    4/25/23: PSMA PET with avid pre-sacral LNs    5/18/23: Start ADT (Trelstar)    6/1/23: Start abiraterone + prednisone    8/4/23: Elevated LFT's- grade 1- continue abiraterone/prednisone, ADT    8/16/23: Started radiation to pelvic lymph nodes    4/17/24: Discontinue ADT, AA/P    7/9/24: PSA < T 44    Cancer History:  Oncology History   Prostate cancer (Multi)   5/10/2023 Initial Diagnosis    Prostate cancer (CMS/HCC)     6/1/2023 -  Chemotherapy    Abiraterone / PredniSONE, 84 Day Cycles     4/18/2024 Cancer Staged    Staging form: Prostate, AJCC 8th Edition, Clinical: Stage KELL (cT2, cN1, cM0) - Signed by Dom Jules MD PhD on 4/18/2024         Interval History:  Man is doing well. His wife thinks he has more energy. He has noticed some occasional hot flashes, as he had during the initial start of therapy, and hadn't had for the last 10 or so months. No other new or concerning symptoms. The remainder of his ROS is otherwise negative.      HPI    Objective    BSA: 1.84 meters squared  /72 (BP Location: Left arm, Patient Position: Sitting, BP Cuff Size: Adult)   Pulse 85   Temp 36.6 °C (97.9 °F) (Temporal)   Resp 18   Wt 69.4 kg (153 lb)   SpO2 95%   BMI 22.58 kg/m²     Physical Exam  PHYSICAL EXAM:   General: alert, well-dressed in NAD. Speech is fluent and coherent, words clear. Good  insight. Oriented x4  Skin: warm, dry, and pink without cyanosis or nail clubbing. No rash, petechiae, or ecchymoses  HEENT: Normocephalic atraumatic. Sclera white, conjunctiva pink. EOMs intact. Hearing intact to spoken voice. No visible lesions  Respiratory: Chest expansion symmetric. No audible wheeze. Unlabored breathing.  CV: Good color  Psych: engaged, polite, appropriate conversation and eye contact.    Current Medications:    Current Outpatient Medications:     docusate sodium (Colace) 50 mg capsule, Take 1 capsule (50 mg) by mouth 2 times a day as needed for constipation., Disp: , Rfl:     Epifoam 1-1 % foam, 2 times a day as needed., Disp: , Rfl:     multivitamin (Daily Multi-Vitamin) tablet, Take 1 tablet by mouth once daily., Disp: , Rfl:      Most Recent Labs:  Results for orders placed or performed in visit on 07/09/24   CBC and Auto Differential   Result Value Ref Range    WBC 4.4 4.4 - 11.3 x10*3/uL    nRBC      RBC 4.85 4.50 - 5.90 x10*6/uL    Hemoglobin 14.4 13.5 - 17.5 g/dL    Hematocrit 43.0 41.0 - 52.0 %    MCV 89 80 - 100 fL    MCH 29.7 26.0 - 34.0 pg    MCHC 33.5 32.0 - 36.0 g/dL    RDW 12.0 11.5 - 14.5 %    Platelets 243 150 - 450 x10*3/uL    Neutrophils % 59.1 40.0 - 80.0 %    Immature Granulocytes %, Automated 0.2 0.0 - 0.9 %    Lymphocytes % 22.5 13.0 - 44.0 %    Monocytes % 11.3 2.0 - 10.0 %    Eosinophils % 6.0 0.0 - 6.0 %    Basophils % 0.9 0.0 - 2.0 %    Neutrophils Absolute 2.57 1.20 - 7.70 x10*3/uL    Immature Granulocytes Absolute, Automated 0.01 0.00 - 0.70 x10*3/uL    Lymphocytes Absolute 0.98 (L) 1.20 - 4.80 x10*3/uL    Monocytes Absolute 0.49 0.10 - 1.00 x10*3/uL    Eosinophils Absolute 0.26 0.00 - 0.70 x10*3/uL    Basophils Absolute 0.04 0.00 - 0.10 x10*3/uL   Comprehensive Metabolic Panel   Result Value Ref Range    Glucose 98 74 - 99 mg/dL    Sodium 139 136 - 145 mmol/L    Potassium 4.4 3.5 - 5.3 mmol/L    Chloride 104 98 - 107 mmol/L    Bicarbonate 29 21 - 32 mmol/L    Anion  Gap 10 10 - 20 mmol/L    Urea Nitrogen 20 6 - 23 mg/dL    Creatinine 0.91 0.50 - 1.30 mg/dL    eGFR >90 >60 mL/min/1.73m*2    Calcium 9.9 8.6 - 10.6 mg/dL    Albumin 4.2 3.4 - 5.0 g/dL    Alkaline Phosphatase 50 33 - 136 U/L    Total Protein 6.4 6.4 - 8.2 g/dL    AST 16 9 - 39 U/L    Bilirubin, Total 0.6 0.0 - 1.2 mg/dL    ALT 15 10 - 52 U/L   Prostate Specific Antigen   Result Value Ref Range    Prostate Specific AG <0.10 <=4.00 ng/mL   Testosterone   Result Value Ref Range    Testosterone 44 (L) 240 - 1,000 ng/dL      Lab Results   Component Value Date    PSA <0.10 07/09/2024    PSA <0.10 04/10/2024    PSA <0.10 01/16/2024        Performance Status:  Asymptomatic  ECOG Score: 0- Fully active, able to carry on all pre-disease performance w/o restriction.  Karnofsky Score: 100 - Fully active, able to carry on all pre-disease performed without restriction      Assessment/Plan   Man Flannery is a 68 y.o. male with a history of LN+ prostate cancer s/p 1 year of ADT, AA/P, and radiation.     He was diagnosed with intermediate risk (iPSA 9.98, Mary GG2) localized prostate cancer s/p EBRT + ADT, completed in August 2020 who in 2023 presented with rising PSA and avid pre-sacral LNs on PSMA PET consistent with recurrent prostate cancer. He received ADT and abiraterone/prednisone for 1 year in addition to radiation to the pelvis.    His T has started to recover, PSA remains undetectable. Hot flashes may be present during rise/fall. Labs otherwise look great.     # Recurrent prostate cancer  - Follows with radiation oncology (Dr. Ordoñez); s/p radiation to pelvis  - Off ADT and abiraterone/prednisone; off treatment April 2024  - Check PSA and testosterone in 3 months     # Adrenal lesion, likely adenoma  - Monitor; repeat CT A/P in December 2024     #Bone Health  - Ca/Vit D, continue with multivitamins/milk  - Encourage regular physical activity     RTC 3 months with labs    Total time spent on this encounter was 45 minutes,  which included preparation, direct time with patient, documentation, and care coordination on the day of visit.    Doris Michelle, MSN, APRN, AGNP-C, AOCNP  Associate Nurse Practitioner  [facility]

## 2024-07-30 ENCOUNTER — APPOINTMENT (OUTPATIENT)
Dept: PRIMARY CARE | Facility: CLINIC | Age: 69
End: 2024-07-30
Payer: MEDICARE

## 2024-07-30 VITALS
HEIGHT: 70 IN | DIASTOLIC BLOOD PRESSURE: 86 MMHG | SYSTOLIC BLOOD PRESSURE: 132 MMHG | WEIGHT: 150 LBS | HEART RATE: 87 BPM | OXYGEN SATURATION: 97 % | BODY MASS INDEX: 21.47 KG/M2 | TEMPERATURE: 97.4 F

## 2024-07-30 DIAGNOSIS — I49.3 PREMATURE VENTRICULAR CONTRACTIONS: ICD-10-CM

## 2024-07-30 DIAGNOSIS — D22.9 NUMEROUS SKIN MOLES: ICD-10-CM

## 2024-07-30 DIAGNOSIS — Z00.00 MEDICARE ANNUAL WELLNESS VISIT, SUBSEQUENT: Primary | ICD-10-CM

## 2024-07-30 DIAGNOSIS — E78.2 MODERATE MIXED HYPERLIPIDEMIA NOT REQUIRING STATIN THERAPY: ICD-10-CM

## 2024-07-30 DIAGNOSIS — C61 PROSTATE CANCER (MULTI): ICD-10-CM

## 2024-07-30 PROBLEM — J32.4 CHRONIC PANSINUSITIS: Status: RESOLVED | Noted: 2023-05-10 | Resolved: 2024-07-30

## 2024-07-30 PROBLEM — I10 BENIGN ESSENTIAL HYPERTENSION: Status: RESOLVED | Noted: 2023-05-10 | Resolved: 2024-07-30

## 2024-07-30 PROBLEM — J34.89 LESION OR MASS OF PARANASAL SINUSES: Status: RESOLVED | Noted: 2023-05-10 | Resolved: 2024-07-30

## 2024-07-30 PROBLEM — R03.0 ELEVATED BLOOD PRESSURE READING: Status: RESOLVED | Noted: 2023-10-22 | Resolved: 2024-07-30

## 2024-07-30 PROBLEM — Z79.818 ANDROGEN DEPRIVATION THERAPY: Status: RESOLVED | Noted: 2023-10-22 | Resolved: 2024-07-30

## 2024-07-30 PROBLEM — E78.5 HYPERLIPIDEMIA: Status: RESOLVED | Noted: 2023-05-10 | Resolved: 2024-07-30

## 2024-07-30 PROBLEM — J34.89 NASAL DRAINAGE: Status: RESOLVED | Noted: 2023-05-10 | Resolved: 2024-07-30

## 2024-07-30 PROBLEM — J31.0 CHRONIC RHINITIS: Status: RESOLVED | Noted: 2023-05-10 | Resolved: 2024-07-30

## 2024-07-30 PROBLEM — N52.35 ERECTILE DYSFUNCTION FOLLOWING RADIATION THERAPY: Status: RESOLVED | Noted: 2023-05-10 | Resolved: 2024-07-30

## 2024-07-30 PROCEDURE — 99214 OFFICE O/P EST MOD 30 MIN: CPT | Performed by: STUDENT IN AN ORGANIZED HEALTH CARE EDUCATION/TRAINING PROGRAM

## 2024-07-30 PROCEDURE — 3008F BODY MASS INDEX DOCD: CPT | Performed by: STUDENT IN AN ORGANIZED HEALTH CARE EDUCATION/TRAINING PROGRAM

## 2024-07-30 PROCEDURE — 1170F FXNL STATUS ASSESSED: CPT | Performed by: STUDENT IN AN ORGANIZED HEALTH CARE EDUCATION/TRAINING PROGRAM

## 2024-07-30 PROCEDURE — 1160F RVW MEDS BY RX/DR IN RCRD: CPT | Performed by: STUDENT IN AN ORGANIZED HEALTH CARE EDUCATION/TRAINING PROGRAM

## 2024-07-30 PROCEDURE — 1126F AMNT PAIN NOTED NONE PRSNT: CPT | Performed by: STUDENT IN AN ORGANIZED HEALTH CARE EDUCATION/TRAINING PROGRAM

## 2024-07-30 PROCEDURE — G0439 PPPS, SUBSEQ VISIT: HCPCS | Performed by: STUDENT IN AN ORGANIZED HEALTH CARE EDUCATION/TRAINING PROGRAM

## 2024-07-30 PROCEDURE — 1036F TOBACCO NON-USER: CPT | Performed by: STUDENT IN AN ORGANIZED HEALTH CARE EDUCATION/TRAINING PROGRAM

## 2024-07-30 PROCEDURE — 3079F DIAST BP 80-89 MM HG: CPT | Performed by: STUDENT IN AN ORGANIZED HEALTH CARE EDUCATION/TRAINING PROGRAM

## 2024-07-30 PROCEDURE — 1123F ACP DISCUSS/DSCN MKR DOCD: CPT | Performed by: STUDENT IN AN ORGANIZED HEALTH CARE EDUCATION/TRAINING PROGRAM

## 2024-07-30 PROCEDURE — 1159F MED LIST DOCD IN RCRD: CPT | Performed by: STUDENT IN AN ORGANIZED HEALTH CARE EDUCATION/TRAINING PROGRAM

## 2024-07-30 PROCEDURE — 3075F SYST BP GE 130 - 139MM HG: CPT | Performed by: STUDENT IN AN ORGANIZED HEALTH CARE EDUCATION/TRAINING PROGRAM

## 2024-07-30 PROCEDURE — 1158F ADVNC CARE PLAN TLK DOCD: CPT | Performed by: STUDENT IN AN ORGANIZED HEALTH CARE EDUCATION/TRAINING PROGRAM

## 2024-07-30 ASSESSMENT — ACTIVITIES OF DAILY LIVING (ADL)
DOING_HOUSEWORK: INDEPENDENT
TAKING_MEDICATION: INDEPENDENT
MANAGING_FINANCES: INDEPENDENT
DOING_HOUSEWORK: INDEPENDENT
MANAGING_FINANCES: INDEPENDENT
GROCERY_SHOPPING: INDEPENDENT
GROCERY_SHOPPING: INDEPENDENT
DRESSING: INDEPENDENT
TAKING_MEDICATION: INDEPENDENT
BATHING: INDEPENDENT

## 2024-07-30 ASSESSMENT — PATIENT HEALTH QUESTIONNAIRE - PHQ9
2. FEELING DOWN, DEPRESSED OR HOPELESS: NOT AT ALL
SUM OF ALL RESPONSES TO PHQ9 QUESTIONS 1 AND 2: 0
1. LITTLE INTEREST OR PLEASURE IN DOING THINGS: NOT AT ALL

## 2024-07-30 ASSESSMENT — PAIN SCALES - GENERAL: PAINLEVEL: 0-NO PAIN

## 2024-07-30 NOTE — PATIENT INSTRUCTIONS
Please stop at the lab ([address]) to complete your blood and/or urine work that I've ordered for you.    I will contact you with the results at my soonest convenience. I strongly urge you to use [employer] as this is the quickest and easiest way to access your results and receive my correspondences.     I recommend the following shots:  Shingrix and RSV at your local pharmacy, as well as Flu and COVID shots this fall.    Follow up with your specialists as previously scheduled.     See me yearly for a Medicare Wellness Visit, and sooner as needed for sick visits

## 2024-07-30 NOTE — PROGRESS NOTES
"Subjective   Reason for Visit: Man Flannery is an 68 y.o. male here for a Medicare Wellness visit.     Past Medical, Surgical, and Family History reviewed and updated in chart.    Reviewed all medications by prescribing practitioner or clinical pharmacist (such as prescriptions, OTCs, herbal therapies and supplements) and documented in the medical record.    HPI  Doing well since last in. Expecting his first grandchild in a few months! Has an upcoming vacation to [place] as well.     Re: prostate cancer - see onc and rad-onc notes. He had recurrence of his cancer requiring ADT (off since 4/2024) and also had radiation to the pelvis. Sx controlled. Has regular follow up at 3 month and 6 month intervals. He has an adrenal adenoma that is being followed up.        Re: CV - Remains unchanged from before.  He had PVCs a few years back. Heart monitor and TTE were essentially normal. Sx resolved. BP at home is at goal, all readings 130 SBP or less, DBP 90 or less. Reports no sx high or low from HTN; denies blurry vision, HA, dizziness LoC CP SoB Palmer and leg swelling      Re: HM - CRS UTD, repeat 2033. PSA through urology. Due for a few shots (RSV, VZV now; flu and COVID this fall)     PMHx, FHx, Social Hx, Surg Hx personally reviewed at this appointment. No pertinent findings and/or changes from prior (if applicable).     ROS: Denies wt gain/loss f/c HA LoC CP SOB NVDC. See HPI above, and scanned sheet (if applicable). All other systems are reviewed and are without complaint.     Patient Care Team:  Seferino Sandy MD as PCP - General (Internal Medicine)  Dmo Jules MD PhD as Consulting Physician (Hematology and Oncology)     Review of Systems    Objective   Vitals:  /86   Pulse 87   Temp 36.3 °C (97.4 °F)   Ht 1.765 m (5' 9.5\")   Wt 68 kg (150 lb)   SpO2 97%   BMI 21.83 kg/m²       Physical Exam  Gen: well developed in NAD. AAO x3.  HEENT: NC/AT. Anicteric sclera, symmetric pupils. MMM no " thrush.  Neck: Soft, supple. No LAD. No goiter.   CV: RRR nl s1s2 no m/r/g  Pulm: CTAB no w/r/r, good air exchange  GI: soft NTND BS+ no hsm  Ext: WWP no edema  Neuro: II-XII grossly intact, nonfocal systemic findings  MSK: 5/5 strength b/l UE and LE  Gait: unremarkable     Lab Results   Component Value Date    WBC 4.4 07/09/2024    HGB 14.4 07/09/2024    HCT 43.0 07/09/2024     07/09/2024    CHOL 198 03/10/2022    TRIG 44 03/10/2022    HDL 55.4 03/10/2022    ALT 15 07/09/2024    AST 16 07/09/2024     07/09/2024    K 4.4 07/09/2024     07/09/2024    CREATININE 0.91 07/09/2024    BUN 20 07/09/2024    CO2 29 07/09/2024    PSA <0.10 07/09/2024     par    MR abdomen w and wo IV contrast  Narrative: Interpreted By:  Lashell Butterfield,  and Priscila Gonsalez   STUDY:  MR ABDOMEN W AND WO IV CONTRAST;  2/9/2024 11:40 am      INDICATION:  Signs/Symptoms:prostate cancer, concern on radiation planning CT for  adrenal and hepatic masses.      COMPARISON:  CT abdomen pelvis dated 12/01/2023      ACCESSION NUMBER(S):  [id number]      ORDERING CLINICIAN:  MARTA BOSWELL      TECHNIQUE:  MRI ADRENALS: Multiplanar magnetic resonance images of the abdomen  were obtained including the following sequences; T2-weighted SSFSE,  T1-weighted GRE in/opposed phase, DWI, fat saturated 3D-T1w GRE  without contrast.  14 ml of  Gadolinium contrast agent Dotarem were  administered intravenously without immediate complication.      FINDINGS:  LIVER:  The liver is normal in size measuring 14.4 cm in craniocaudal length.  Hepatic parenchyma is isointense on T1 in and out of phase sequences.  There are scattered T2 hyperintense cysts throughout the liver, the  largest in segment 4B measuring 0.7 cm. Along the anterior margin of  segment 4B there is a faintly T2 hyperintense lesion measuring 0.9 x  0.8 cm which demonstrates arterial hyperenhancement with persistent  enhancement on the more delayed postcontrast sequences, lacks  diffusion  restriction, and appears to communicate with a vessel.      BILE DUCTS:  No intrahepatic or extrahepatic bile duct dilatation is demonstrated.      GALLBLADDER:  Within normal limits.      PANCREAS:  Normal signal intensity. Normal enhancement. No masses. The  pancreatic duct is normal.      SPLEEN:  The spleen is normal in size without evidence of focal lesions.      ADRENAL GLANDS:  There is a 2.4 x 1.9 cm well-circumscribed left adrenal gland nodule  which demonstrates T2 intermediate signal, mild signal intensity drop  on opposed phase relative to inphase imaging, and demonstrates mild  enhancement postcontrast sequences, overall compatible with a lipid  poor adenoma.      KIDNEYS:  The kidneys are normal size and enhancement. Bilateral simple renal  cysts measuring up to 1.2 cm on the left and 1.0 cm on the right.      LYMPH NODES:  No lymphadenopathy.      ABDOMINAL VESSELS:  Aorta and the major abdominal arterial vessels demonstrate no gross  abnormality.  Superior mesenteric vein, splenic vein, and main, right  and left portal vein are patent. Hepatic veins are patent.  No  significant collaterals or esophageal varices are present.      BOWEL:  Within normal limits.      PERITONEUM/RETROPERITONEUM:  No ascites.      BONES AND LOWER THORAX:  No abnormally enhancing focal bony lesions are identified. Multilevel  discogenic degenerative disease is noted in several levels of the  thoraco- lumbar spine.  Limited evaluation of the lower chest show no  acute abnormality.      Impression: 1. A 2.5 cm left adrenal nodule, with MR characteristics compatible  with adenoma.  2. The previously noted enhancing lesion in segment 4B is likely  represent a benign vascular shunt versus less likely flash filling  hemangioma. Additionally there are several subcentimeter simple  hepatic cysts. No suspicious enhancing liver lesions.      I personally reviewed the images/study and I agree with the findings  as stated by resident  physician Dr. Abisai Francois.      MACRO:  None      Signed by: Lashell Butterfield 2/9/2024 8:44 PM  Dictation workstation:   [id number]      Current Outpatient Medications   Medication Instructions    docusate sodium (Colace) 50 mg capsule 1 capsule, oral, 2 times daily PRN    Epifoam 1-1 % foam 2 times a day as needed.    multivitamin (Daily Multi-Vitamin) tablet 1 tablet, oral, Daily      Assessment/Plan   In great health.    # Prostate Ca: in resmission now, off ADT and completed XRT  - follow up urology and rad-onc  - PSA as per their recommendations    # BP: at goal  - lifestyle modifications     # Health Maintenance  - routine blood work  - Colon Cancer Screening: UTD  - PSA: through urology  - Immunizations: RSV and VZV at pharmacy    Problem List Items Addressed This Visit    None

## 2024-09-24 ENCOUNTER — APPOINTMENT (OUTPATIENT)
Dept: UROLOGY | Facility: CLINIC | Age: 69
End: 2024-09-24
Payer: MEDICARE

## 2024-09-24 VITALS — TEMPERATURE: 98.1 F

## 2024-09-24 DIAGNOSIS — N40.0 BENIGN LOCALIZED HYPERPLASIA OF PROSTATE: ICD-10-CM

## 2024-09-24 LAB
POC APPEARANCE, URINE: CLEAR
POC BILIRUBIN, URINE: NEGATIVE
POC BLOOD, URINE: NEGATIVE
POC COLOR, URINE: YELLOW
POC GLUCOSE, URINE: NEGATIVE MG/DL
POC KETONES, URINE: NEGATIVE MG/DL
POC LEUKOCYTES, URINE: NEGATIVE
POC NITRITE,URINE: NEGATIVE
POC PH, URINE: 5.5 PH
POC PROTEIN, URINE: NEGATIVE MG/DL
POC SPECIFIC GRAVITY, URINE: 1.02
POC UROBILINOGEN, URINE: 0.2 EU/DL

## 2024-09-24 PROCEDURE — 81003 URINALYSIS AUTO W/O SCOPE: CPT | Performed by: PHYSICIAN ASSISTANT

## 2024-09-24 PROCEDURE — 99213 OFFICE O/P EST LOW 20 MIN: CPT | Performed by: PHYSICIAN ASSISTANT

## 2024-09-24 PROCEDURE — 1036F TOBACCO NON-USER: CPT | Performed by: PHYSICIAN ASSISTANT

## 2024-09-24 PROCEDURE — 1123F ACP DISCUSS/DSCN MKR DOCD: CPT | Performed by: PHYSICIAN ASSISTANT

## 2024-09-24 PROCEDURE — 1159F MED LIST DOCD IN RCRD: CPT | Performed by: PHYSICIAN ASSISTANT

## 2024-09-24 PROCEDURE — 1126F AMNT PAIN NOTED NONE PRSNT: CPT | Performed by: PHYSICIAN ASSISTANT

## 2024-09-24 ASSESSMENT — ENCOUNTER SYMPTOMS
HEMATOLOGIC/LYMPHATIC NEGATIVE: 1
CARDIOVASCULAR NEGATIVE: 1
MUSCULOSKELETAL NEGATIVE: 1
ALLERGIC/IMMUNOLOGIC NEGATIVE: 1
PSYCHIATRIC NEGATIVE: 1
GASTROINTESTINAL NEGATIVE: 1
NEUROLOGICAL NEGATIVE: 1
CONSTITUTIONAL NEGATIVE: 1
EYES NEGATIVE: 1
ENDOCRINE NEGATIVE: 1
RESPIRATORY NEGATIVE: 1

## 2024-09-24 ASSESSMENT — PAIN SCALES - GENERAL: PAINLEVEL: 0-NO PAIN

## 2024-09-24 NOTE — PROGRESS NOTES
Subjective   Patient ID: Man Flannery is a 68 y.o. male who presents for Benign Prostatic Hypertrophy.  Benign Prostatic Hypertrophy      Patient is a 68 year male with prostate cancer GG2, intraductal carcinoma completed radiation therapy 8/2020 plus a short course of ADT, rising PSA in 4/2021 with evidence of recurrent pre-sacral LN  stated on ADT , abiraterone and prednisose competed therapy 04/2024.    Patient doing well. He denies bothersome urinary difficulties. Noctruai x2. He denies nocturia or hematuria.   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 He had a colonoscopy last year showing hemorrhoids. Negative for masses.     Lab Results   Component Value Date    PSA <0.10 07/09/2024    PSA <0.10 04/10/2024    PSA <0.10 01/16/2024    PSA <0.10 10/24/2023    PSA <0.10 08/03/2023    PSA 0.61 06/27/2023    PSA 2.82 05/30/2023    PSA 2.87 05/10/2023    PSA 2.35 03/15/2023    PSA 1.65 09/14/2022     UA negative    Review of Systems   Constitutional: Negative.    HENT: Negative.     Eyes: Negative.    Respiratory: Negative.     Cardiovascular: Negative.    Gastrointestinal: Negative.    Endocrine: Negative.    Genitourinary: Negative.    Musculoskeletal: Negative.    Skin: Negative.    Allergic/Immunologic: Negative.    Neurological: Negative.    Hematological: Negative.    Psychiatric/Behavioral: Negative.         Objective   Physical Exam  Constitutional:       General: He is not in acute distress.     Appearance: Normal appearance.   HENT:      Head: Normocephalic and atraumatic.      Nose: Nose normal.      Mouth/Throat:      Mouth: Mucous membranes are moist.   Cardiovascular:      Rate and Rhythm: Normal rate.   Pulmonary:      Effort: Pulmonary effort is normal.   Abdominal:      General: Abdomen is flat.      Palpations: Abdomen is soft.   Genitourinary:     Penis: Normal.       Testes: Normal.   Musculoskeletal:      Cervical back: Normal range of motion.    Neurological:      Mental Status: He is alert.         Assessment/Plan     Prostate cancer  PSA undetectable  Patient following with medical onclogy every 3 months and radiation oncology every 6 months.    BPH  Symptoms stable. He would like to be observed for now.   He will let me know if symptoms worsen     Follow up in 1 year or sooner as needed       Riley Borjas PA-C 09/24/24 11:37 AM

## 2024-09-25 ENCOUNTER — APPOINTMENT (OUTPATIENT)
Dept: UROLOGY | Facility: CLINIC | Age: 69
End: 2024-09-25
Payer: MEDICARE

## 2024-10-23 ENCOUNTER — LAB (OUTPATIENT)
Dept: LAB | Facility: CLINIC | Age: 69
End: 2024-10-23
Payer: MEDICARE

## 2024-10-23 DIAGNOSIS — E78.2 MODERATE MIXED HYPERLIPIDEMIA NOT REQUIRING STATIN THERAPY: ICD-10-CM

## 2024-10-23 DIAGNOSIS — C61 PROSTATE CANCER (MULTI): ICD-10-CM

## 2024-10-23 LAB
ALBUMIN SERPL BCP-MCNC: 4.1 G/DL (ref 3.4–5)
ALP SERPL-CCNC: 57 U/L (ref 33–136)
ALT SERPL W P-5'-P-CCNC: 15 U/L (ref 10–52)
ANION GAP SERPL CALC-SCNC: 13 MMOL/L (ref 10–20)
AST SERPL W P-5'-P-CCNC: 20 U/L (ref 9–39)
BASOPHILS # BLD AUTO: 0.04 X10*3/UL (ref 0–0.1)
BASOPHILS NFR BLD AUTO: 0.9 %
BILIRUB SERPL-MCNC: 0.6 MG/DL (ref 0–1.2)
BUN SERPL-MCNC: 21 MG/DL (ref 6–23)
CALCIUM SERPL-MCNC: 9.8 MG/DL (ref 8.6–10.6)
CHLORIDE SERPL-SCNC: 105 MMOL/L (ref 98–107)
CHOLEST SERPL-MCNC: 251 MG/DL (ref 0–199)
CHOLESTEROL/HDL RATIO: 3.8
CO2 SERPL-SCNC: 29 MMOL/L (ref 21–32)
CREAT SERPL-MCNC: 0.81 MG/DL (ref 0.5–1.3)
EGFRCR SERPLBLD CKD-EPI 2021: >90 ML/MIN/1.73M*2
EOSINOPHIL # BLD AUTO: 0.22 X10*3/UL (ref 0–0.7)
EOSINOPHIL NFR BLD AUTO: 5 %
ERYTHROCYTE [DISTWIDTH] IN BLOOD BY AUTOMATED COUNT: 12.2 % (ref 11.5–14.5)
GLUCOSE SERPL-MCNC: 92 MG/DL (ref 74–99)
HCT VFR BLD AUTO: 44.6 % (ref 41–52)
HDLC SERPL-MCNC: 65.7 MG/DL
HGB BLD-MCNC: 14.5 G/DL (ref 13.5–17.5)
IMM GRANULOCYTES # BLD AUTO: 0.01 X10*3/UL (ref 0–0.7)
IMM GRANULOCYTES NFR BLD AUTO: 0.2 % (ref 0–0.9)
LDLC SERPL CALC-MCNC: 174 MG/DL
LYMPHOCYTES # BLD AUTO: 1.04 X10*3/UL (ref 1.2–4.8)
LYMPHOCYTES NFR BLD AUTO: 23.9 %
MCH RBC QN AUTO: 29 PG (ref 26–34)
MCHC RBC AUTO-ENTMCNC: 32.5 G/DL (ref 32–36)
MCV RBC AUTO: 89 FL (ref 80–100)
MONOCYTES # BLD AUTO: 0.48 X10*3/UL (ref 0.1–1)
MONOCYTES NFR BLD AUTO: 11 %
NEUTROPHILS # BLD AUTO: 2.57 X10*3/UL (ref 1.2–7.7)
NEUTROPHILS NFR BLD AUTO: 59 %
NON HDL CHOLESTEROL: 185 MG/DL (ref 0–149)
NRBC BLD-RTO: ABNORMAL /100{WBCS}
PLATELET # BLD AUTO: 287 X10*3/UL (ref 150–450)
POTASSIUM SERPL-SCNC: 4.5 MMOL/L (ref 3.5–5.3)
PROT SERPL-MCNC: 6.5 G/DL (ref 6.4–8.2)
PSA SERPL-MCNC: 0.28 NG/ML
RBC # BLD AUTO: 5 X10*6/UL (ref 4.5–5.9)
SODIUM SERPL-SCNC: 142 MMOL/L (ref 136–145)
TESTOST SERPL-MCNC: 205 NG/DL (ref 240–1000)
TRIGL SERPL-MCNC: 56 MG/DL (ref 0–149)
VLDL: 11 MG/DL (ref 0–40)
WBC # BLD AUTO: 4.4 X10*3/UL (ref 4.4–11.3)

## 2024-10-23 PROCEDURE — 85025 COMPLETE CBC W/AUTO DIFF WBC: CPT

## 2024-10-23 PROCEDURE — 36415 COLL VENOUS BLD VENIPUNCTURE: CPT

## 2024-10-23 PROCEDURE — 84075 ASSAY ALKALINE PHOSPHATASE: CPT

## 2024-10-23 PROCEDURE — 84153 ASSAY OF PSA TOTAL: CPT

## 2024-10-23 PROCEDURE — 84403 ASSAY OF TOTAL TESTOSTERONE: CPT

## 2024-10-23 PROCEDURE — 80061 LIPID PANEL: CPT

## 2024-10-29 ENCOUNTER — TELEPHONE (OUTPATIENT)
Dept: HEMATOLOGY/ONCOLOGY | Facility: HOSPITAL | Age: 69
End: 2024-10-29

## 2024-10-29 ENCOUNTER — OFFICE VISIT (OUTPATIENT)
Dept: HEMATOLOGY/ONCOLOGY | Facility: HOSPITAL | Age: 69
End: 2024-10-29
Payer: MEDICARE

## 2024-10-29 VITALS
SYSTOLIC BLOOD PRESSURE: 160 MMHG | DIASTOLIC BLOOD PRESSURE: 82 MMHG | BODY MASS INDEX: 22.53 KG/M2 | OXYGEN SATURATION: 99 % | TEMPERATURE: 97.2 F | HEART RATE: 85 BPM | RESPIRATION RATE: 20 BRPM | WEIGHT: 154.76 LBS

## 2024-10-29 DIAGNOSIS — C61 PROSTATE CANCER (MULTI): Primary | ICD-10-CM

## 2024-10-29 PROCEDURE — 1123F ACP DISCUSS/DSCN MKR DOCD: CPT | Performed by: NURSE PRACTITIONER

## 2024-10-29 PROCEDURE — 1126F AMNT PAIN NOTED NONE PRSNT: CPT | Performed by: NURSE PRACTITIONER

## 2024-10-29 PROCEDURE — 1036F TOBACCO NON-USER: CPT | Performed by: NURSE PRACTITIONER

## 2024-10-29 PROCEDURE — 1159F MED LIST DOCD IN RCRD: CPT | Performed by: NURSE PRACTITIONER

## 2024-10-29 PROCEDURE — 3079F DIAST BP 80-89 MM HG: CPT | Performed by: NURSE PRACTITIONER

## 2024-10-29 PROCEDURE — 99215 OFFICE O/P EST HI 40 MIN: CPT | Performed by: NURSE PRACTITIONER

## 2024-10-29 PROCEDURE — 3077F SYST BP >= 140 MM HG: CPT | Performed by: NURSE PRACTITIONER

## 2024-10-29 ASSESSMENT — PAIN SCALES - GENERAL: PAINLEVEL_OUTOF10: 0-NO PAIN

## 2024-12-13 ENCOUNTER — SPECIALTY PHARMACY (OUTPATIENT)
Dept: PHARMACY | Facility: CLINIC | Age: 69
End: 2024-12-13

## 2024-12-13 DIAGNOSIS — K62.7 RADIATION PROCTITIS: Primary | ICD-10-CM

## 2024-12-13 PROCEDURE — RXMED WILLOW AMBULATORY MEDICATION CHARGE

## 2024-12-17 ENCOUNTER — PHARMACY VISIT (OUTPATIENT)
Dept: PHARMACY | Facility: CLINIC | Age: 69
End: 2024-12-17
Payer: COMMERCIAL

## 2025-02-07 ENCOUNTER — LAB (OUTPATIENT)
Dept: LAB | Facility: CLINIC | Age: 70
End: 2025-02-07
Payer: MEDICARE

## 2025-02-07 DIAGNOSIS — C61 PROSTATE CANCER (MULTI): ICD-10-CM

## 2025-02-07 DIAGNOSIS — C61 MALIGNANT NEOPLASM OF PROSTATE (MULTI): ICD-10-CM

## 2025-02-07 LAB
PSA SERPL-MCNC: 0.24 NG/ML
TESTOST SERPL-MCNC: 123 NG/DL (ref 240–1000)

## 2025-02-07 PROCEDURE — 84403 ASSAY OF TOTAL TESTOSTERONE: CPT

## 2025-02-07 PROCEDURE — 84153 ASSAY OF PSA TOTAL: CPT

## 2025-02-07 PROCEDURE — 36415 COLL VENOUS BLD VENIPUNCTURE: CPT

## 2025-02-10 ENCOUNTER — OFFICE VISIT (OUTPATIENT)
Dept: HEMATOLOGY/ONCOLOGY | Facility: HOSPITAL | Age: 70
End: 2025-02-10
Payer: MEDICARE

## 2025-02-10 VITALS
DIASTOLIC BLOOD PRESSURE: 85 MMHG | OXYGEN SATURATION: 99 % | RESPIRATION RATE: 18 BRPM | WEIGHT: 154 LBS | HEART RATE: 78 BPM | BODY MASS INDEX: 22.42 KG/M2 | SYSTOLIC BLOOD PRESSURE: 141 MMHG | TEMPERATURE: 96.6 F

## 2025-02-10 DIAGNOSIS — C61 PROSTATE CANCER (MULTI): Primary | ICD-10-CM

## 2025-02-10 PROCEDURE — 3077F SYST BP >= 140 MM HG: CPT | Performed by: NURSE PRACTITIONER

## 2025-02-10 PROCEDURE — 99215 OFFICE O/P EST HI 40 MIN: CPT | Performed by: NURSE PRACTITIONER

## 2025-02-10 PROCEDURE — 1123F ACP DISCUSS/DSCN MKR DOCD: CPT | Performed by: NURSE PRACTITIONER

## 2025-02-10 PROCEDURE — 1036F TOBACCO NON-USER: CPT | Performed by: NURSE PRACTITIONER

## 2025-02-10 PROCEDURE — 3079F DIAST BP 80-89 MM HG: CPT | Performed by: NURSE PRACTITIONER

## 2025-02-10 PROCEDURE — 1159F MED LIST DOCD IN RCRD: CPT | Performed by: NURSE PRACTITIONER

## 2025-02-10 PROCEDURE — 1126F AMNT PAIN NOTED NONE PRSNT: CPT | Performed by: NURSE PRACTITIONER

## 2025-02-10 ASSESSMENT — PAIN SCALES - GENERAL: PAINLEVEL_OUTOF10: 0-NO PAIN

## 2025-02-10 NOTE — PROGRESS NOTES
Patient ID: Man Flannery is a 69 y.o. male.  Attending Physician: Dr. Dom Jules  Cancer Diagnosis:  Cancer Staging   Prostate cancer (Multi)  Staging form: Prostate, AJCC 8th Edition  - Clinical: Stage KELL (cT2, cN1, cM0) - Signed by Dom Jules MD PhD on 4/18/2024    Current Therapy: ADT (Trelstart IM q12mos)  Abiraterone Acetate 1000 mg PO daily  Prednisone 5 mg PO daily    Subjective     2/5/19: Dx intermediate prostate ca ([place] 3+4=7/iPSA 9.98/ GG2)    8/28/20: completed EBRT + 6 months ADT    9/25/20: PSA kimmy 0.42    4/5/21: PSA 1.24    3/10/22: PSA 1.44    9/14/22: PSA 1.65    3/15/23: PSA 2.35    4/25/23: PSMA PET with avid pre-sacral LNs    5/18/23: Start ADT (Trelstar)    6/1/23: Start abiraterone + prednisone    8/4/23: Elevated LFT's- grade 1- continue abiraterone/prednisone, ADT    8/16/23: Started radiation to pelvic lymph nodes    4/17/24: Discontinue ADT, AA/P    7/9/24: PSA <0.1 T 44    10/23/24: PSA 0.28, T 205    2/7/25: PSA 0.24, T 123    Cancer History:  Oncology History   Prostate cancer (Multi)   5/10/2023 Initial Diagnosis    Prostate cancer (CMS/HCC)     6/1/2023 -  Chemotherapy    Abiraterone / PredniSONE, 84 Day Cycles     4/18/2024 Cancer Staged    Staging form: Prostate, AJCC 8th Edition, Clinical: Stage KELL (cT2, cN1, cM0) - Signed by Dom Jules MD PhD on 4/18/2024         Interval History:  Man is doing well. He and his wife just got back from [place] and [place], and are going to visit their new granddaughter. Weight is stable, distribution may have improved a bit as well. Otherwise, without new or concerning symptoms.The remainder of his ROS is otherwise negative.  HPI    Objective    BSA: 1.85 meters squared  /85 (BP Location: Left arm, Patient Position: Sitting, BP Cuff Size: Adult)   Pulse 78   Temp 35.9 °C (96.6 °F) (Temporal)   Resp 18   Wt 69.9 kg (154 lb)   SpO2 99%   BMI 22.42 kg/m²     Physical Exam  PHYSICAL EXAM:   General: alert, well-dressed  in NAD. Speech is fluent and coherent, words clear. Good insight.  Skin: warm, dry, and pink without cyanosis or nail clubbing. No rash, petechiae, or ecchymoses  HEENT: Normocephalic atraumatic. Sclera white, conjunctiva pink. EOMs intact. Hearing intact to spoken voice. No visible lesions  Respiratory: Chest expansion symmetric. No audible wheeze. Unlabored breathing.  CV: Good color  Psych: engaged, polite, appropriate conversation and eye contact.    Current Medications:    Current Outpatient Medications:     docusate sodium (Colace) 50 mg capsule, Take 1 capsule (50 mg) by mouth 2 times a day as needed for constipation., Disp: , Rfl:     Epifoam 1-1 % foam, 2 times a day as needed., Disp: , Rfl:     hydrocortisone-pramoxine (Proctofoam-HC) rectal foam, Insert 1 applicator into the rectum 2 times a day., Disp: 10 g, Rfl: 3    multivitamin (Daily Multi-Vitamin) tablet, Take 1 tablet by mouth once daily., Disp: , Rfl:      Most Recent Labs:  Results for orders placed or performed in visit on 02/07/25   Prostate Specific Antigen    Collection Time: 02/07/25  8:45 AM   Result Value Ref Range    Prostate Specific AG 0.24 <=4.00 ng/mL   Testosterone    Collection Time: 02/07/25  8:45 AM   Result Value Ref Range    Testosterone 123 (L) 240 - 1,000 ng/dL      Lab Results   Component Value Date    PSA 0.24 02/07/2025    PSA 0.28 10/23/2024    PSA <0.10 07/09/2024        Performance Status:  Asymptomatic  ECOG Score: 0- Fully active, able to carry on all pre-disease performance w/o restriction.  Karnofsky Score: 100 - Fully active, able to carry on all pre-disease performed without restriction      Assessment/Plan   Man Flannery is a 69 y.o. male with a history of LN+ prostate cancer s/p 1 year of ADT, AA/P, and radiation.     He was diagnosed with intermediate risk (iPSA 9.98, Mary GG2) localized prostate cancer s/p EBRT + ADT, completed in August 2020 who in 2023 presented with rising PSA and avid pre-sacral LNs on  PSMA PET consistent with recurrent prostate cancer. He received ADT and abiraterone/prednisone for 1 year in addition to radiation to the pelvis.    His T has some slight decline, though baseline between 4-800. PSA is detectable and low. Discussed that he has not had a prostatectomy, so 2 would be cutoff. He will have CT AP prior to next apt to check on adrenal lesion.     # Recurrent prostate cancer  - Follows with radiation oncology (Dr. Ordoñez); s/p radiation to pelvis  - Off ADT and abiraterone/prednisone since April 2024  - Check PSA and testosterone in 3 months     # Adrenal lesion, likely adenoma  - Monitor; repeat CT A/P in December 2024 (February due to timing)     #Bone Health  - Ca/Vit D, continue with multivitamins/milk  - Encourage regular physical activity     RTC 3 months with labs    Total time spent on this encounter was 45 minutes, which included preparation, direct time with patient, documentation, and care coordination on the day of visit.    Doris Michelle, MSN, APRN, AGNP-C, AOCNP  Associate Nurse Practitioner  [facility] [place], [facility]

## 2025-02-12 ENCOUNTER — APPOINTMENT (OUTPATIENT)
Dept: HEMATOLOGY/ONCOLOGY | Facility: HOSPITAL | Age: 70
End: 2025-02-12
Payer: MEDICARE

## 2025-02-12 ASSESSMENT — ENCOUNTER SYMPTOMS
ALLERGIC/IMMUNOLOGIC NEGATIVE: 1
SHORTNESS OF BREATH: 0
FATIGUE: 0
PSYCHIATRIC NEGATIVE: 1
RECTAL PAIN: 0
FEVER: 0
ABDOMINAL PAIN: 0
UNEXPECTED WEIGHT CHANGE: 0
DIARRHEA: 0
WEAKNESS: 0
JOINT SWELLING: 0
DIZZINESS: 0
HEMATURIA: 0
ANAL BLEEDING: 0
CONSTIPATION: 0
CHEST TIGHTNESS: 0
PALPITATIONS: 0
BLOOD IN STOOL: 0
BACK PAIN: 0
DIFFICULTY URINATING: 0
COUGH: 0
FREQUENCY: 0
DYSURIA: 0
ARTHRALGIAS: 0

## 2025-02-12 NOTE — PROGRESS NOTES
Cancer synopsis:  Rad/onc: Dr. Ordoñez/ Dat CNP  Med/onc: Dr. Jules/ Viviana CNP    68 yo M with a history of favorable intermediate prostate cancer (cT1c, iPSA 9.98, GG2 on 2/12 cores+,  intraductal carcinoma present) who completed 70Gy/28fx to the prostate+SV (completed 8/28/20) + short-course ADT, with a PSA kimmy of 0.42 on 9/25/2020. The patient had evidence of rising since 4/5/2021 (with PSA 1.24), and had evidence of biochemical  recurrence in 3/15/2023 with a PSA of 2.35. PSMA PET was completed on 4/25/2023 which showed evidence of pre-sacral LN with PSMA avidity, without any evidence of local recurrence in the previously treated areas.     5/18/23: Start ADT (Trelstar)     6/1/23: Start abiraterone + prednisone    08/11/2023: Rt to Pelvic LN    04/2024: Completed AA/p and ADT    Recent imaging:  MRI abd 02/2024: 1. A 2.5 cm left adrenal nodule, with MR characteristics compatible  with adenoma. (Monitor at this time)    History of presenting illness:    Patient ID: [id number]     Man Flannery is a 69 y.o. male who presents for his oligometastasic hormone sensitive prostate cancer now s/p RT and on current lng-term hormonal therapy.    RT Site: Prostate and SV 2020 and Pelivs LN 2023  RT Date: 07/22/2020 and 08/11/2023  Hormone therapy: Yes, completed Adt and AA/p on 04/2024  Hot Flushes: Denies  Fatigue: Denies  Bone pain: Denies  ED: Admits to loss of sexual function since   ED medications: No  IPSS: virtual  Urinary symptoms: Admits to nocturia once a night. Denies hematuria or dysuria. Denies frequency, urgency or urine leakage.  Urinary Medications: No  Rectal bleeding: Admits occasional rectal bleeding. Does have proctofoam rx however has not had to use of recent.  Colonoscopy: 06/2023: internal hemorrhoids noted and RT proctitis. Next due in 10yrs  Other systems: Denies SOB, CP or fever.    Review of systems:  Review of Systems   Constitutional:  Negative for fatigue, fever and unexpected weight change.    Respiratory:  Negative for cough, chest tightness and shortness of breath.    Cardiovascular:  Negative for chest pain, palpitations and leg swelling.   Gastrointestinal:  Negative for abdominal pain, anal bleeding, blood in stool, constipation, diarrhea and rectal pain.   Endocrine: Negative for cold intolerance, heat intolerance and polyuria.   Genitourinary:  Negative for decreased urine volume, difficulty urinating, dysuria, frequency, hematuria and urgency.   Musculoskeletal:  Negative for arthralgias, back pain, gait problem and joint swelling.   Skin: Negative.    Allergic/Immunologic: Negative.    Neurological:  Negative for dizziness, syncope and weakness.   Psychiatric/Behavioral: Negative.       Past Medical history  Past Medical History:   Diagnosis Date    Allergic     Androgen deprivation therapy 10/22/2023    Benign essential hypertension 05/10/2023    Cancer (Multi) 12/2019    Chronic pansinusitis 05/10/2023    Chronic rhinitis 05/10/2023    Dysphagia, unspecified 10/03/2014    Pill dysphagia    Erectile dysfunction following radiation therapy 05/10/2023    Hyperlipidemia 05/10/2023    Lesion or mass of paranasal sinuses 05/10/2023      Surgical/family history  Family History   Problem Relation Name Age of Onset    Hypertension Mother Macy Lopez     Vision loss Mother Macy Lopez     Cancer Father Kenji Flannery       Past Surgical History:   Procedure Laterality Date    ADENOIDECTOMY  1960    BACK SURGERY  1983    CIRCUMCISION, PRIMARY      OTHER SURGICAL HISTORY  03/15/2014    Injection For Chemonucleolysis Of Disk, With Discography    TONSILLECTOMY  09/24/2013    Tonsillectomy    WISDOM TOOTH EXTRACTION        Social History  Tobacco Use: Low Risk  (2/10/2025)    Patient History     Smoking Tobacco Use: Never     Smokeless Tobacco Use: Never     Passive Exposure: Not on file       Current med list:  Current Outpatient Medications   Medication Instructions    docusate sodium (Colace) 50 mg  capsule 1 capsule, 2 times daily PRN    Epifoam 1-1 % foam 2 times a day as needed.    hydrocortisone-pramoxine (Proctofoam-HC) rectal foam 1 applicator, rectal, 2 times daily    multivitamin (Daily Multi-Vitamin) tablet 1 tablet, Daily      Last recorded vital:  virtual    Physical exam  virtual    Pertinent labs:  Prostate Specific AG   Date/Time Value Ref Range Status   02/07/2025 08:45 AM 0.24 <=4.00 ng/mL Final     Dx:  Problem List Items Addressed This Visit    None  Visit Diagnoses       Malignant neoplasm of prostate (Multi)        Relevant Orders    Clinic Appointment Request Follow Up; [facility] (virtual); [facility] RAD[facility] (virtual) (Completed)        Psa stable at 0.24, testosterone low at 123. Would not adivse exogenous testosterone d/t prostate cancer hx. Advised increased physical activity to aid in regular return.  Review of latent SE including rectal bleeding, hematuria, urinary strictures, ED where reviewed as well as how to contact office if s/s present. Denies latent SE. Discussed given low risk for latent SE or RT at this time would move to PRN as survivorship for prostate cancer is being managed by med/onc.    Recommend vitamin D supplementation, start (or increase by) 400-1000 units daily. Reviewed importance of intake while on Testerone lowering therapy as well as after until Testerone re-establishes, at which point may stop if DEXA indicative of normal bone density. Also reviewed that individuals at a higher risk such as those over the age of 75 or those with a hx of bone fx, osteoporosis or osteopenia to continue supplementation indefinitely with routine DEXA imaging as per national guidelines to assess bone health continually.    PLAN:  FUV PRN  Labs per med/onc for systemic therapy  Imaging none  FUV other providers: Med/onc for systemic therapy, PCP for routine evals    Please contact office with any concerns:  [facility] C[facility]  [phone]    I performed this visit using  realtime telehealth tools, including an audio/video OR telephone connection between patient’s name and location Man Flannery and [place] AOCNP.    2. POS 10: Telehealth provided in patient's home.  o Patient is located in their home (which is a location other than a hospital or other  facility where the patient receives care in a private residence) when receiving  health services or health related services through telecommunication technology.

## 2025-02-13 ENCOUNTER — TELEPHONE (OUTPATIENT)
Dept: RADIATION ONCOLOGY | Facility: HOSPITAL | Age: 70
End: 2025-02-13
Payer: MEDICARE

## 2025-02-14 ENCOUNTER — HOSPITAL ENCOUNTER (OUTPATIENT)
Dept: RADIATION ONCOLOGY | Facility: HOSPITAL | Age: 70
Setting detail: RADIATION/ONCOLOGY SERIES
Discharge: HOME | End: 2025-02-14
Payer: MEDICARE

## 2025-02-14 DIAGNOSIS — C61 MALIGNANT NEOPLASM OF PROSTATE (MULTI): ICD-10-CM

## 2025-02-14 PROCEDURE — 99213 OFFICE O/P EST LOW 20 MIN: CPT

## 2025-02-14 PROCEDURE — 99213 OFFICE O/P EST LOW 20 MIN: CPT | Mod: 95

## 2025-02-17 ENCOUNTER — APPOINTMENT (OUTPATIENT)
Dept: HEMATOLOGY/ONCOLOGY | Facility: HOSPITAL | Age: 70
End: 2025-02-17
Payer: MEDICARE

## 2025-02-18 ENCOUNTER — APPOINTMENT (OUTPATIENT)
Dept: HEMATOLOGY/ONCOLOGY | Facility: HOSPITAL | Age: 70
End: 2025-02-18
Payer: MEDICARE

## 2025-02-19 ENCOUNTER — APPOINTMENT (OUTPATIENT)
Dept: HEMATOLOGY/ONCOLOGY | Facility: HOSPITAL | Age: 70
End: 2025-02-19
Payer: MEDICARE

## 2025-03-21 ENCOUNTER — PATIENT MESSAGE (OUTPATIENT)
Dept: HEMATOLOGY/ONCOLOGY | Facility: HOSPITAL | Age: 70
End: 2025-03-21
Payer: MEDICARE

## 2025-03-21 DIAGNOSIS — C61 PROSTATE CANCER (MULTI): Primary | ICD-10-CM

## 2025-04-04 ENCOUNTER — LAB (OUTPATIENT)
Dept: LAB | Facility: CLINIC | Age: 70
End: 2025-04-04
Payer: MEDICARE

## 2025-04-04 DIAGNOSIS — C61 PROSTATE CANCER (MULTI): ICD-10-CM

## 2025-04-04 LAB
ALBUMIN SERPL BCP-MCNC: 4.3 G/DL (ref 3.4–5)
ALP SERPL-CCNC: 53 U/L (ref 33–136)
ALT SERPL W P-5'-P-CCNC: 20 U/L (ref 10–52)
ANION GAP SERPL CALC-SCNC: 13 MMOL/L (ref 10–20)
AST SERPL W P-5'-P-CCNC: 17 U/L (ref 9–39)
BASOPHILS # BLD AUTO: 0.04 X10*3/UL (ref 0–0.1)
BASOPHILS NFR BLD AUTO: 0.9 %
BILIRUB SERPL-MCNC: 0.5 MG/DL (ref 0–1.2)
BUN SERPL-MCNC: 26 MG/DL (ref 6–23)
CALCIUM SERPL-MCNC: 9.8 MG/DL (ref 8.6–10.6)
CHLORIDE SERPL-SCNC: 103 MMOL/L (ref 98–107)
CO2 SERPL-SCNC: 27 MMOL/L (ref 21–32)
CREAT SERPL-MCNC: 0.94 MG/DL (ref 0.5–1.3)
EGFRCR SERPLBLD CKD-EPI 2021: 87 ML/MIN/1.73M*2
EOSINOPHIL # BLD AUTO: 0.21 X10*3/UL (ref 0–0.7)
EOSINOPHIL NFR BLD AUTO: 4.7 %
ERYTHROCYTE [DISTWIDTH] IN BLOOD BY AUTOMATED COUNT: 12.3 % (ref 11.5–14.5)
GLUCOSE SERPL-MCNC: 120 MG/DL (ref 74–99)
HCT VFR BLD AUTO: 43.8 % (ref 41–52)
HGB BLD-MCNC: 14.3 G/DL (ref 13.5–17.5)
IMM GRANULOCYTES # BLD AUTO: 0.01 X10*3/UL (ref 0–0.7)
IMM GRANULOCYTES NFR BLD AUTO: 0.2 % (ref 0–0.9)
LYMPHOCYTES # BLD AUTO: 1.24 X10*3/UL (ref 1.2–4.8)
LYMPHOCYTES NFR BLD AUTO: 27.7 %
MCH RBC QN AUTO: 28.8 PG (ref 26–34)
MCHC RBC AUTO-ENTMCNC: 32.6 G/DL (ref 32–36)
MCV RBC AUTO: 88 FL (ref 80–100)
MONOCYTES # BLD AUTO: 0.59 X10*3/UL (ref 0.1–1)
MONOCYTES NFR BLD AUTO: 13.2 %
NEUTROPHILS # BLD AUTO: 2.39 X10*3/UL (ref 1.2–7.7)
NEUTROPHILS NFR BLD AUTO: 53.3 %
NRBC BLD-RTO: NORMAL /100{WBCS}
PLATELET # BLD AUTO: 282 X10*3/UL (ref 150–450)
POTASSIUM SERPL-SCNC: 4.5 MMOL/L (ref 3.5–5.3)
PROT SERPL-MCNC: 6.4 G/DL (ref 6.4–8.2)
PSA SERPL-MCNC: 0.3 NG/ML
RBC # BLD AUTO: 4.97 X10*6/UL (ref 4.5–5.9)
SODIUM SERPL-SCNC: 138 MMOL/L (ref 136–145)
TESTOST SERPL-MCNC: 168 NG/DL (ref 240–1000)
WBC # BLD AUTO: 4.5 X10*3/UL (ref 4.4–11.3)

## 2025-04-04 PROCEDURE — 85025 COMPLETE CBC W/AUTO DIFF WBC: CPT

## 2025-04-04 PROCEDURE — 36415 COLL VENOUS BLD VENIPUNCTURE: CPT

## 2025-04-04 PROCEDURE — 84153 ASSAY OF PSA TOTAL: CPT

## 2025-04-04 PROCEDURE — 84403 ASSAY OF TOTAL TESTOSTERONE: CPT

## 2025-04-04 PROCEDURE — 80053 COMPREHEN METABOLIC PANEL: CPT

## 2025-04-09 ENCOUNTER — HOSPITAL ENCOUNTER (OUTPATIENT)
Dept: RADIOLOGY | Facility: CLINIC | Age: 70
Discharge: HOME | End: 2025-04-09
Payer: MEDICARE

## 2025-04-09 DIAGNOSIS — C61 MALIGNANT NEOPLASM OF PROSTATE (MULTI): ICD-10-CM

## 2025-04-09 PROCEDURE — 2550000001 HC RX 255 CONTRASTS: Performed by: NURSE PRACTITIONER

## 2025-04-09 PROCEDURE — 74177 CT ABD & PELVIS W/CONTRAST: CPT

## 2025-04-09 RX ADMIN — IOHEXOL 75 ML: 350 INJECTION, SOLUTION INTRAVENOUS at 09:49

## 2025-04-16 ENCOUNTER — APPOINTMENT (OUTPATIENT)
Dept: RADIOLOGY | Facility: CLINIC | Age: 70
End: 2025-04-16
Payer: MEDICARE

## 2025-05-07 ENCOUNTER — OFFICE VISIT (OUTPATIENT)
Dept: HEMATOLOGY/ONCOLOGY | Facility: HOSPITAL | Age: 70
End: 2025-05-07
Payer: MEDICARE

## 2025-05-07 VITALS
DIASTOLIC BLOOD PRESSURE: 90 MMHG | HEIGHT: 69 IN | BODY MASS INDEX: 23.05 KG/M2 | WEIGHT: 155.65 LBS | TEMPERATURE: 97 F | HEART RATE: 84 BPM | SYSTOLIC BLOOD PRESSURE: 159 MMHG | OXYGEN SATURATION: 98 % | RESPIRATION RATE: 16 BRPM

## 2025-05-07 DIAGNOSIS — C61 PROSTATE CANCER (MULTI): Primary | ICD-10-CM

## 2025-05-07 DIAGNOSIS — D35.00 ADRENAL ADENOMA, UNSPECIFIED LATERALITY: ICD-10-CM

## 2025-05-07 PROCEDURE — 1159F MED LIST DOCD IN RCRD: CPT | Performed by: STUDENT IN AN ORGANIZED HEALTH CARE EDUCATION/TRAINING PROGRAM

## 2025-05-07 PROCEDURE — 99214 OFFICE O/P EST MOD 30 MIN: CPT | Performed by: STUDENT IN AN ORGANIZED HEALTH CARE EDUCATION/TRAINING PROGRAM

## 2025-05-07 PROCEDURE — 3077F SYST BP >= 140 MM HG: CPT | Performed by: STUDENT IN AN ORGANIZED HEALTH CARE EDUCATION/TRAINING PROGRAM

## 2025-05-07 PROCEDURE — 1125F AMNT PAIN NOTED PAIN PRSNT: CPT | Performed by: STUDENT IN AN ORGANIZED HEALTH CARE EDUCATION/TRAINING PROGRAM

## 2025-05-07 PROCEDURE — 1036F TOBACCO NON-USER: CPT | Performed by: STUDENT IN AN ORGANIZED HEALTH CARE EDUCATION/TRAINING PROGRAM

## 2025-05-07 PROCEDURE — G2211 COMPLEX E/M VISIT ADD ON: HCPCS | Performed by: STUDENT IN AN ORGANIZED HEALTH CARE EDUCATION/TRAINING PROGRAM

## 2025-05-07 PROCEDURE — 3080F DIAST BP >= 90 MM HG: CPT | Performed by: STUDENT IN AN ORGANIZED HEALTH CARE EDUCATION/TRAINING PROGRAM

## 2025-05-07 PROCEDURE — 3008F BODY MASS INDEX DOCD: CPT | Performed by: STUDENT IN AN ORGANIZED HEALTH CARE EDUCATION/TRAINING PROGRAM

## 2025-05-07 ASSESSMENT — PAIN SCALES - GENERAL: PAINLEVEL_OUTOF10: 2

## 2025-05-07 NOTE — PROGRESS NOTES
"Patient ID: Man Flannery is a 69 y.o. male.  Attending Physician: Dr. Dom Jules  Cancer Diagnosis:  Cancer Staging   Prostate cancer (Multi)  Staging form: Prostate, AJCC 8th Edition  - Clinical: Stage KELL (cT2, cN1, cM0) - Signed by Dom Jules MD PhD on 4/18/2024    Current Therapy: ADT (Trelstart IM q12mos)  Abiraterone Acetate 1000 mg PO daily  Prednisone 5 mg PO daily    Subjective     2/5/19: Dx intermediate prostate ca (Mary 3+4=7/iPSA 9.98/ GG2)    8/28/20: completed EBRT + 6 months ADT    9/25/20: PSA kimmy 0.42    4/5/21: PSA 1.24    3/10/22: PSA 1.44    9/14/22: PSA 1.65    3/15/23: PSA 2.35    4/25/23: PSMA PET with avid pre-sacral LNs    5/18/23: Start ADT (Trelstar)    6/1/23: Start abiraterone + prednisone    8/4/23: Elevated LFT's- grade 1- continue abiraterone/prednisone, ADT    8/16/23: Started radiation to pelvic lymph nodes    4/17/24: Discontinue ADT, AA/P    7/9/24: PSA <0.1 T 44    10/23/24: PSA 0.28, T 205    2/7/25: PSA 0.24, T 123    4/4/25: PSA 0.30, T 168    Cancer History:  Oncology History   Prostate cancer (Multi)   5/10/2023 Initial Diagnosis    Prostate cancer (CMS/HCC)     6/1/2023 -  Chemotherapy    Abiraterone / PredniSONE, 84 Day Cycles     4/18/2024 Cancer Staged    Staging form: Prostate, AJCC 8th Edition, Clinical: Stage KELL (cT2, cN1, cM0) - Signed by Dom Jules MD PhD on 4/18/2024         Interval History:  Man is doing well. He and his [place] just got [place] from [place] [facility], and are going to visit their new granddaughter. Weight is stable, distribution may have improved a bit as well. Otherwise, without new or concerning symptoms.The remainder of his ROS is otherwise negative.  HPI    Objective    BSA: 1.85 meters squared  /90   Pulse 84   Temp 36.1 °C (97 °F)   Resp 16   Ht (S) 1.754 m (5' 9.06\")   Wt 70.6 kg (155 lb 10.3 oz)   SpO2 98%   BMI 22.95 kg/m²     Physical Exam  PHYSICAL EXAM:   General: alert, well-dressed in NAD. Speech is " fluent and coherent, words clear. Good insight.  Skin: warm, dry, and pink without cyanosis or nail clubbing. No rash, petechiae, or ecchymoses  HEENT: Normocephalic atraumatic. Sclera white, conjunctiva pink. EOMs intact. Hearing intact to spoken voice. No visible lesions  Respiratory: Chest expansion symmetric. No audible wheeze. Unlabored breathing.  CV: Good color  Psych: engaged, polite, appropriate conversation and eye contact.    Current Medications:    Current Outpatient Medications:     docusate sodium (Colace) 50 mg capsule, Take 1 capsule (50 mg) by mouth 2 times a day as needed for constipation., Disp: , Rfl:     hydrocortisone-pramoxine (Proctofoam-HC) rectal foam, Insert 1 applicator into the rectum 2 times a day., Disp: 10 g, Rfl: 3    multivitamin (Daily Multi-Vitamin) tablet, Take 1 tablet by mouth once daily., Disp: , Rfl:     Epifoam 1-1 % foam, 2 times a day as needed., Disp: , Rfl:      Most Recent Labs:  Results for orders placed or performed in visit on 04/04/25   Testosterone    Collection Time: 04/04/25  8:46 AM   Result Value Ref Range    Testosterone 168 (L) 240 - 1,000 ng/dL   Prostate Specific Antigen    Collection Time: 04/04/25  8:46 AM   Result Value Ref Range    Prostate Specific AG 0.30 <=4.00 ng/mL   Comprehensive Metabolic Panel    Collection Time: 04/04/25  8:46 AM   Result Value Ref Range    Glucose 120 (H) 74 - 99 mg/dL    Sodium 138 136 - 145 mmol/L    Potassium 4.5 3.5 - 5.3 mmol/L    Chloride 103 98 - 107 mmol/L    Bicarbonate 27 21 - 32 mmol/L    Anion Gap 13 10 - 20 mmol/L    Urea Nitrogen 26 (H) 6 - 23 mg/dL    Creatinine 0.94 0.50 - 1.30 mg/dL    eGFR 87 >60 mL/min/1.73m*2    Calcium 9.8 8.6 - 10.6 mg/dL    Albumin 4.3 3.4 - 5.0 g/dL    Alkaline Phosphatase 53 33 - 136 U/L    Total Protein 6.4 6.4 - 8.2 g/dL    AST 17 9 - 39 U/L    Bilirubin, Total 0.5 0.0 - 1.2 mg/dL    ALT 20 10 - 52 U/L   CBC and Auto Differential    Collection Time: 04/04/25  8:46 AM   Result Value  Ref Range    WBC 4.5 4.4 - 11.3 x10*3/uL    nRBC      RBC 4.97 4.50 - 5.90 x10*6/uL    Hemoglobin 14.3 13.5 - 17.5 g/dL    Hematocrit 43.8 41.0 - 52.0 %    MCV 88 80 - 100 fL    MCH 28.8 26.0 - 34.0 pg    MCHC 32.6 32.0 - 36.0 g/dL    RDW 12.3 11.5 - 14.5 %    Platelets 282 150 - 450 x10*3/uL    Neutrophils % 53.3 40.0 - 80.0 %    Immature Granulocytes %, Automated 0.2 0.0 - 0.9 %    Lymphocytes % 27.7 13.0 - 44.0 %    Monocytes % 13.2 2.0 - 10.0 %    Eosinophils % 4.7 0.0 - 6.0 %    Basophils % 0.9 0.0 - 2.0 %    Neutrophils Absolute 2.39 1.20 - 7.70 x10*3/uL    Immature Granulocytes Absolute, Automated 0.01 0.00 - 0.70 x10*3/uL    Lymphocytes Absolute 1.24 1.20 - 4.80 x10*3/uL    Monocytes Absolute 0.59 0.10 - 1.00 x10*3/uL    Eosinophils Absolute 0.21 0.00 - 0.70 x10*3/uL    Basophils Absolute 0.04 0.00 - 0.10 x10*3/uL      Lab Results   Component Value Date    PSA 0.30 04/04/2025    PSA 0.24 02/07/2025    PSA 0.28 10/23/2024        Performance Status:  Asymptomatic  ECOG Score: 0- Fully active, able to carry on all pre-disease performance w/o restriction.  Karnofsky Score: 100 - Fully active, able to carry on all pre-disease performed without restriction      Assessment/Plan   Man Flannery is a 69 y.o. male with a history of LN+ prostate cancer s/p 1 year of ADT, AA/P, and radiation.     He was diagnosed with intermediate risk (iPSA 9.98, Mary GG2) localized prostate cancer s/p EBRT + ADT, completed in August 2020 who in 2023 presented with rising PSA and avid pre-sacral LNs on PSMA PET consistent with recurrent prostate cancer. He received ADT and abiraterone/prednisone for 1 year in addition to radiation to the pelvis.    His T has some slight decline, though baseline between 4-800. PSA is detectable and low. Discussed that he has not had a prostatectomy, so 2 would be cutoff. He will have CT AP prior to next apt to check on adrenal lesion.     # Recurrent prostate cancer  - Follows with radiation  oncology (Dr. Ordoñez); s/p radiation to pelvis  - Off ADT and abiraterone/prednisone since April 2024  - Check PSA and testosterone in 3 months     # Adrenal lesion, likely adenoma  - Monitor; repeat CT A/P in April 2025 has remained stable     #Bone Health  - Ca/Vit D, continue with multivitamins/milk  - Encourage regular physical activity     RTC 3 months with labs    Isaak Almaraz MD  Medical Oncology Fellow  5/7/2025

## 2025-05-07 NOTE — PROGRESS NOTES
" Oncology Return Visit      Patient ID: Man Flannery is a 69 y.o. male who presents for follow up of prostate cancer  Primary Care Provider: Seferino Sandy MD    Patient Timeline    Date  Event    2/5/19  Prostate biopsy - intermediate prostate ca (Mary 3+4=7/iPSA 9.98/ GG2)     8/28/20 Completed EBRT + 6 months ADT     9/25/20 PSA kimmy 0.42     4/5/21  PSA 1.24     3/10/22 PSA 1.44     9/14/22 PSA 1.65     3/15/23 PSA 2.35     4/25/23 PSMA PET with avid pre-sacral LNs     5/18/23 Start ADT (Trelstar)     6/1/23  Start abiraterone + prednisone     8/4/23  Elevated LFT's- grade 1- continue abiraterone/prednisone, ADT     8/16/23 Started radiation to pelvic lymph nodes    4/17/24 Discontinue ADT, Abiraterone/prednisone     Cancer Staging   Prostate cancer (Multi)  Staging form: Prostate, AJCC 8th Edition  - Clinical: Stage KELL (cT2, cN1, cM0) - Signed by Dom Jules MD PhD on 4/18/2024    HPI    Man Flnanery presents accompanied by his wife. He feels well. He did \"tweak his back\" a few weeks ago but is still active. He has stable urinary frequency but no urgency, dysuria, or hematuria. Energy level and appetite are good. He reports no fevers, chills, night sweats, dyspnea, chest pain, abdominal pain, nausea, vomiting, diarrhea, constipation, extremity weakness, neuropathy, skin changes/rash, easy bleeding/bruising, vision changes, or headaches.    ROS    A 14 point review of systems was performed and is negative unless otherwise stated in the HPI    PMH    Past Medical History:   Diagnosis Date    Allergic     Androgen deprivation therapy 10/22/2023    Benign essential hypertension 05/10/2023    Cancer (Multi) 12/2019    Chronic pansinusitis 05/10/2023    Chronic rhinitis 05/10/2023    Dysphagia, unspecified 10/03/2014    Pill dysphagia    Erectile dysfunction following radiation therapy 05/10/2023    Hyperlipidemia 05/10/2023    Lesion or mass of paranasal sinuses 05/10/2023    " "    Medications    Current Outpatient Medications   Medication Instructions    docusate sodium (Colace) 50 mg capsule 1 capsule, 2 times daily PRN    Epifoam 1-1 % foam 2 times a day as needed.    hydrocortisone-pramoxine (Proctofoam-HC) rectal foam 1 applicator, rectal, 2 times daily    multivitamin (Daily Multi-Vitamin) tablet 1 tablet, Daily        Fam Hx    Family History   Problem Relation Name Age of Onset    Hypertension Mother Macy Lopez     Vision loss Mother Macy Lopez     Cancer Father Kenji Flannery        Social Hx    Lives with wife in house in [place]. Used to work as a .   Man Flannery  reports that he has never smoked. He has never used smokeless tobacco.  He  reports current alcohol use of about 7.0 standard drinks of alcohol per week.  He  reports no history of drug use.    Objective     Physical Examination    /90   Pulse 84   Temp 36.1 °C (97 °F)   Resp 16   Ht (S) 1.754 m (5' 9.06\")   Wt 70.6 kg (155 lb 10.3 oz)   SpO2 98%   BMI 22.95 kg/m²   BSA: 1.85 meters squared    Performance Status:  Asymptomatic (ECO)    Physical Exam    GENERAL: Well appearing, in no apparent distress  HEENT: No cervical or supraclavicular adenopathy. Mucous membranes moist.  CV: Regular rate and rhythm, Normal S1, S2, no murmurs/rubs/gallops  RESP: Normal work of breathing, CTAB without wheeze or crackles  ABD: Normoactive bowel sounds. Soft, nontender, nondistended.  EXT: Warm and well perfused, no edema  NEURO: A&O x 3, normal gait  SKIN: No visible rashes or bruising.  PSYCH: Normal affect.    Results    Labs  Lab Results   Component Value Date    WBC 4.5 2025    HGB 14.3 2025    HCT 43.8 2025    MCV 88 2025     2025     Lab Results   Component Value Date    GLUCOSE 120 (H) 2025    CALCIUM 9.8 2025     2025    K 4.5 2025    CO2 27 2025     2025    BUN 26 (H) 2025    CREATININE 0.94 " 04/04/2025     Lab Results   Component Value Date    ALT 20 04/04/2025    AST 17 04/04/2025    ALKPHOS 53 04/04/2025    BILITOT 0.5 04/04/2025      PSA Trend  Lab Results   Component Value Date    PSA 0.30 04/04/2025    PSA 0.24 02/07/2025    PSA 0.28 10/23/2024     Testosterone < 30     Imaging    Imaging personally reviewed in EHR and summarized below    === 04/09/25 ===    CT ABDOMEN PELVIS W IV CONTRAST    - Impression -  Small enhancing liver lesion suggestive of shunt or flash filling type hemangioma on MRI 9 February 2024 is unchanged for size all the way back through the oldest pertinent comparison exam, 1 December 2023    Left adrenal nodule described as an adenoma on the prior MRI is unchanged in size    No compelling evidence for active metastatic disease in the abdomen or pelvis    No acute unexpected findings such as typhlitis or other colitis, bowel obstruction, gallbladder or biliary duct dilation, hydronephrosis, acute pancreatitis, pleural or pericardial effusion      Genomics    Germline:  None    Somatic: None    Assessment/Plan    Man Flannery is a 69 y.o. year old male diagnosed with intermediate risk (iPSA 9.98, Mary GG2) localized prostate cancer s/p EBRT + ADT, completed in August 2020 who in 2023 presented with rising PSA and avid pre-sacral LNs on PSMA PET consistent with recurrent prostate cancer. He received ADT and abiraterone/prednisone for 1 year in addition to radiation to the pelvis.    He has been off systemic treatment for one year with no evidence of PSA recurrence. He will continue on surveillance.    # Recurrent prostate cancer  - Follows with radiation oncology (Dr. Ordoñez); s/p radiation to pelvis  - OFF ADT/abiraterone/prednisone since April 2024  - Check PSA and testosterone in 6 months     # Adrenal lesion, likely adenoma  - Stable on repeat imaging, no further imaging necessary     #Bone Health  - Encourage regular physical activity  - No supplementation necessary    The  above plan was discussed with the patient and family, and they were in agreement. All questions were answered to their satisfaction.    RV 6 months with labs (CBC, CMP, PSA, Testosterone)    More than 30 minutes were spent in face-to-face encounter, review of medical records, coordination of care, and documentation.

## 2025-09-24 ENCOUNTER — APPOINTMENT (OUTPATIENT)
Dept: UROLOGY | Facility: CLINIC | Age: 70
End: 2025-09-24
Payer: MEDICARE

## 2025-12-16 ENCOUNTER — APPOINTMENT (OUTPATIENT)
Dept: PRIMARY CARE | Facility: CLINIC | Age: 70
End: 2025-12-16
Payer: MEDICARE